# Patient Record
Sex: MALE | Race: WHITE | NOT HISPANIC OR LATINO | Employment: FULL TIME | URBAN - METROPOLITAN AREA
[De-identification: names, ages, dates, MRNs, and addresses within clinical notes are randomized per-mention and may not be internally consistent; named-entity substitution may affect disease eponyms.]

---

## 2017-02-27 ENCOUNTER — TRANSCRIBE ORDERS (OUTPATIENT)
Dept: LAB | Facility: HOSPITAL | Age: 52
End: 2017-02-27

## 2017-02-27 ENCOUNTER — LAB (OUTPATIENT)
Dept: LAB | Facility: HOSPITAL | Age: 52
End: 2017-02-27

## 2017-02-27 DIAGNOSIS — E10.65 UNCONTROLLED TYPE 1 DIABETES MELLITUS WITH HYPERGLYCEMIA (HCC): ICD-10-CM

## 2017-02-27 DIAGNOSIS — E10.65 UNCONTROLLED TYPE 1 DIABETES MELLITUS WITH HYPERGLYCEMIA (HCC): Primary | ICD-10-CM

## 2017-02-27 DIAGNOSIS — E78.00 PURE HYPERCHOLESTEROLEMIA: ICD-10-CM

## 2017-02-27 LAB
ALBUMIN SERPL-MCNC: 4.3 G/DL (ref 3.2–4.8)
ALBUMIN/GLOB SERPL: 1.6 G/DL (ref 1.5–2.5)
ALP SERPL-CCNC: 91 U/L (ref 25–100)
ALT SERPL W P-5'-P-CCNC: 17 U/L (ref 7–40)
ANION GAP SERPL CALCULATED.3IONS-SCNC: 2 MMOL/L (ref 3–11)
ARTICHOKE IGE QN: 83 MG/DL (ref 0–130)
AST SERPL-CCNC: 20 U/L (ref 0–33)
BILIRUB SERPL-MCNC: 0.6 MG/DL (ref 0.3–1.2)
BUN BLD-MCNC: 16 MG/DL (ref 9–23)
BUN/CREAT SERPL: 20 (ref 7–25)
CALCIUM SPEC-SCNC: 9.9 MG/DL (ref 8.7–10.4)
CHLORIDE SERPL-SCNC: 104 MMOL/L (ref 99–109)
CHOLEST SERPL-MCNC: 140 MG/DL (ref 0–200)
CO2 SERPL-SCNC: 35 MMOL/L (ref 20–31)
CREAT BLD-MCNC: 0.8 MG/DL (ref 0.6–1.3)
GFR SERPL CREATININE-BSD FRML MDRD: 102 ML/MIN/1.73
GLOBULIN UR ELPH-MCNC: 2.7 GM/DL
GLUCOSE BLD-MCNC: 139 MG/DL (ref 70–100)
HDLC SERPL-MCNC: 56 MG/DL (ref 40–60)
POTASSIUM BLD-SCNC: 4.4 MMOL/L (ref 3.5–5.5)
PROT SERPL-MCNC: 7 G/DL (ref 5.7–8.2)
SODIUM BLD-SCNC: 141 MMOL/L (ref 132–146)
TRIGL SERPL-MCNC: 55 MG/DL (ref 0–150)

## 2017-02-27 PROCEDURE — 80053 COMPREHEN METABOLIC PANEL: CPT | Performed by: INTERNAL MEDICINE

## 2017-02-27 PROCEDURE — 80061 LIPID PANEL: CPT | Performed by: INTERNAL MEDICINE

## 2017-02-27 PROCEDURE — 36415 COLL VENOUS BLD VENIPUNCTURE: CPT

## 2017-04-17 RX ORDER — TADALAFIL 5 MG
TABLET ORAL
Qty: 90 TABLET | Refills: 1 | Status: SHIPPED | OUTPATIENT
Start: 2017-04-17 | End: 2017-10-20 | Stop reason: SDUPTHER

## 2017-04-18 PROBLEM — N40.1 BENIGN PROSTATIC HYPERPLASIA WITH URINARY OBSTRUCTION: Status: ACTIVE | Noted: 2017-04-18

## 2017-04-18 PROBLEM — N13.8 BENIGN PROSTATIC HYPERPLASIA WITH URINARY OBSTRUCTION: Status: ACTIVE | Noted: 2017-04-18

## 2017-04-18 PROBLEM — E78.5 DYSLIPIDEMIA: Status: ACTIVE | Noted: 2017-04-18

## 2017-04-18 PROBLEM — E10.9 TYPE 1 DIABETES MELLITUS (HCC): Status: ACTIVE | Noted: 2017-04-18

## 2017-08-04 ENCOUNTER — TRANSCRIBE ORDERS (OUTPATIENT)
Dept: LAB | Facility: HOSPITAL | Age: 52
End: 2017-08-04

## 2017-08-04 ENCOUNTER — LAB (OUTPATIENT)
Dept: LAB | Facility: HOSPITAL | Age: 52
End: 2017-08-04

## 2017-08-04 ENCOUNTER — APPOINTMENT (OUTPATIENT)
Dept: LAB | Facility: HOSPITAL | Age: 52
End: 2017-08-04

## 2017-08-04 DIAGNOSIS — E87.0 TYPE I DIABETES MELLITUS WITH HYPEROSMOLAR COMA (HCC): Primary | ICD-10-CM

## 2017-08-04 DIAGNOSIS — E87.0 TYPE I DIABETES MELLITUS WITH HYPEROSMOLAR COMA (HCC): ICD-10-CM

## 2017-08-04 DIAGNOSIS — E10.69 TYPE I DIABETES MELLITUS WITH HYPEROSMOLAR COMA (HCC): Primary | ICD-10-CM

## 2017-08-04 DIAGNOSIS — E10.65 TYPE I DIABETES MELLITUS WITH HYPEROSMOLAR COMA (HCC): ICD-10-CM

## 2017-08-04 DIAGNOSIS — E10.65 TYPE I DIABETES MELLITUS WITH HYPEROSMOLAR COMA (HCC): Primary | ICD-10-CM

## 2017-08-04 DIAGNOSIS — E10.69 TYPE I DIABETES MELLITUS WITH HYPEROSMOLAR COMA (HCC): ICD-10-CM

## 2017-08-04 LAB
ALBUMIN SERPL-MCNC: 4.2 G/DL (ref 3.2–4.8)
ALBUMIN/GLOB SERPL: 1.8 G/DL (ref 1.5–2.5)
ALP SERPL-CCNC: 93 U/L (ref 25–100)
ALT SERPL W P-5'-P-CCNC: 20 U/L (ref 7–40)
ANION GAP SERPL CALCULATED.3IONS-SCNC: 4 MMOL/L (ref 3–11)
ARTICHOKE IGE QN: 74 MG/DL (ref 0–130)
AST SERPL-CCNC: 21 U/L (ref 0–33)
BILIRUB SERPL-MCNC: 0.5 MG/DL (ref 0.3–1.2)
BUN BLD-MCNC: 13 MG/DL (ref 9–23)
BUN/CREAT SERPL: 16.3 (ref 7–25)
CALCIUM SPEC-SCNC: 9.7 MG/DL (ref 8.7–10.4)
CHLORIDE SERPL-SCNC: 105 MMOL/L (ref 99–109)
CHOLEST SERPL-MCNC: 135 MG/DL (ref 0–200)
CO2 SERPL-SCNC: 31 MMOL/L (ref 20–31)
CREAT BLD-MCNC: 0.8 MG/DL (ref 0.6–1.3)
GFR SERPL CREATININE-BSD FRML MDRD: 101 ML/MIN/1.73
GLOBULIN UR ELPH-MCNC: 2.3 GM/DL
GLUCOSE BLD-MCNC: 105 MG/DL (ref 70–100)
HDLC SERPL-MCNC: 55 MG/DL (ref 40–60)
POTASSIUM BLD-SCNC: 4.4 MMOL/L (ref 3.5–5.5)
PROT SERPL-MCNC: 6.5 G/DL (ref 5.7–8.2)
SODIUM BLD-SCNC: 140 MMOL/L (ref 132–146)
T4 FREE SERPL-MCNC: 1.04 NG/DL (ref 0.89–1.76)
TRIGL SERPL-MCNC: 45 MG/DL (ref 0–150)
TSH SERPL DL<=0.05 MIU/L-ACNC: 1.65 MIU/ML (ref 0.35–5.35)

## 2017-08-04 PROCEDURE — 82570 ASSAY OF URINE CREATININE: CPT | Performed by: INTERNAL MEDICINE

## 2017-08-04 PROCEDURE — 36415 COLL VENOUS BLD VENIPUNCTURE: CPT

## 2017-08-04 PROCEDURE — 80053 COMPREHEN METABOLIC PANEL: CPT | Performed by: INTERNAL MEDICINE

## 2017-08-04 PROCEDURE — 84443 ASSAY THYROID STIM HORMONE: CPT | Performed by: INTERNAL MEDICINE

## 2017-08-04 PROCEDURE — 82043 UR ALBUMIN QUANTITATIVE: CPT | Performed by: INTERNAL MEDICINE

## 2017-08-04 PROCEDURE — 80061 LIPID PANEL: CPT | Performed by: INTERNAL MEDICINE

## 2017-08-04 PROCEDURE — 84439 ASSAY OF FREE THYROXINE: CPT | Performed by: INTERNAL MEDICINE

## 2017-08-06 LAB
CREAT 24H UR-MCNC: 172 MG/DL
MICROALB/CRT. RATIO UR: 2.2 MG/G CREAT (ref 0–30)
MICROALBUMIN UR-MCNC: 3.8 UG/ML

## 2017-10-20 DIAGNOSIS — N40.1 BENIGN PROSTATIC HYPERPLASIA WITH URINARY OBSTRUCTION: Primary | ICD-10-CM

## 2017-10-20 DIAGNOSIS — N13.8 BENIGN PROSTATIC HYPERPLASIA WITH URINARY OBSTRUCTION: Primary | ICD-10-CM

## 2017-10-20 RX ORDER — TADALAFIL 5 MG/1
5 TABLET ORAL DAILY
Qty: 90 TABLET | Refills: 0 | Status: SHIPPED | OUTPATIENT
Start: 2017-10-20 | End: 2018-03-12 | Stop reason: SDUPTHER

## 2018-03-12 DIAGNOSIS — N40.1 BENIGN PROSTATIC HYPERPLASIA WITH URINARY OBSTRUCTION: ICD-10-CM

## 2018-03-12 DIAGNOSIS — N13.8 BENIGN PROSTATIC HYPERPLASIA WITH URINARY OBSTRUCTION: ICD-10-CM

## 2018-03-12 RX ORDER — TADALAFIL 5 MG
TABLET ORAL
Qty: 90 TABLET | Refills: 0 | Status: SHIPPED | OUTPATIENT
Start: 2018-03-12 | End: 2022-02-07 | Stop reason: ALTCHOICE

## 2018-03-12 NOTE — TELEPHONE ENCOUNTER
PLease call, I sent in the Cialis for him but he is due for visit. Has been > 1 year since seen. bds

## 2018-04-10 ENCOUNTER — TRANSCRIBE ORDERS (OUTPATIENT)
Dept: LAB | Facility: HOSPITAL | Age: 53
End: 2018-04-10

## 2018-04-10 ENCOUNTER — LAB (OUTPATIENT)
Dept: LAB | Facility: HOSPITAL | Age: 53
End: 2018-04-10

## 2018-04-10 DIAGNOSIS — E87.0 TYPE I DIABETES MELLITUS WITH HYPEROSMOLAR COMA (HCC): ICD-10-CM

## 2018-04-10 DIAGNOSIS — E10.65 TYPE I DIABETES MELLITUS WITH HYPEROSMOLAR COMA (HCC): Primary | ICD-10-CM

## 2018-04-10 DIAGNOSIS — E10.69 TYPE I DIABETES MELLITUS WITH HYPEROSMOLAR COMA (HCC): ICD-10-CM

## 2018-04-10 DIAGNOSIS — E10.65 TYPE I DIABETES MELLITUS WITH HYPEROSMOLAR COMA (HCC): ICD-10-CM

## 2018-04-10 DIAGNOSIS — E87.0 TYPE I DIABETES MELLITUS WITH HYPEROSMOLAR COMA (HCC): Primary | ICD-10-CM

## 2018-04-10 DIAGNOSIS — E10.69 TYPE I DIABETES MELLITUS WITH HYPEROSMOLAR COMA (HCC): Primary | ICD-10-CM

## 2018-04-10 LAB
ALBUMIN SERPL-MCNC: 4.4 G/DL (ref 3.2–4.8)
ALBUMIN/GLOB SERPL: 1.8 G/DL (ref 1.5–2.5)
ALP SERPL-CCNC: 94 U/L (ref 25–100)
ALT SERPL W P-5'-P-CCNC: 19 U/L (ref 7–40)
ANION GAP SERPL CALCULATED.3IONS-SCNC: 6 MMOL/L (ref 3–11)
ARTICHOKE IGE QN: 67 MG/DL (ref 0–130)
AST SERPL-CCNC: 24 U/L (ref 0–33)
BILIRUB SERPL-MCNC: 0.6 MG/DL (ref 0.3–1.2)
BUN BLD-MCNC: 14 MG/DL (ref 9–23)
BUN/CREAT SERPL: 15.6 (ref 7–25)
CALCIUM SPEC-SCNC: 9.4 MG/DL (ref 8.7–10.4)
CHLORIDE SERPL-SCNC: 106 MMOL/L (ref 99–109)
CHOLEST SERPL-MCNC: 128 MG/DL (ref 0–200)
CO2 SERPL-SCNC: 31 MMOL/L (ref 20–31)
CREAT BLD-MCNC: 0.9 MG/DL (ref 0.6–1.3)
GFR SERPL CREATININE-BSD FRML MDRD: 88 ML/MIN/1.73
GLOBULIN UR ELPH-MCNC: 2.4 GM/DL
GLUCOSE BLD-MCNC: 120 MG/DL (ref 70–100)
HDLC SERPL-MCNC: 50 MG/DL (ref 40–60)
POTASSIUM BLD-SCNC: 4.4 MMOL/L (ref 3.5–5.5)
PROT SERPL-MCNC: 6.8 G/DL (ref 5.7–8.2)
SODIUM BLD-SCNC: 143 MMOL/L (ref 132–146)
TRIGL SERPL-MCNC: 50 MG/DL (ref 0–150)
TSH SERPL DL<=0.05 MIU/L-ACNC: 1.21 MIU/ML (ref 0.35–5.35)

## 2018-04-10 PROCEDURE — 80053 COMPREHEN METABOLIC PANEL: CPT

## 2018-04-10 PROCEDURE — 80061 LIPID PANEL: CPT | Performed by: INTERNAL MEDICINE

## 2018-04-10 PROCEDURE — 84681 ASSAY OF C-PEPTIDE: CPT

## 2018-04-10 PROCEDURE — 84443 ASSAY THYROID STIM HORMONE: CPT

## 2018-04-10 PROCEDURE — 36415 COLL VENOUS BLD VENIPUNCTURE: CPT

## 2018-04-12 LAB — C PEPTIDE SERPL-MCNC: <0.1 NG/ML (ref 1.1–4.4)

## 2018-06-12 ENCOUNTER — TELEPHONE (OUTPATIENT)
Dept: FAMILY MEDICINE CLINIC | Facility: CLINIC | Age: 53
End: 2018-06-12

## 2018-06-12 DIAGNOSIS — N40.1 BENIGN PROSTATIC HYPERPLASIA WITH URINARY OBSTRUCTION: ICD-10-CM

## 2018-06-12 DIAGNOSIS — N13.8 BENIGN PROSTATIC HYPERPLASIA WITH URINARY OBSTRUCTION: ICD-10-CM

## 2018-06-12 RX ORDER — TADALAFIL 5 MG
TABLET ORAL
Qty: 90 TABLET | Refills: 0 | OUTPATIENT
Start: 2018-06-12

## 2018-06-12 NOTE — TELEPHONE ENCOUNTER
Please call patient, Rec'd refill for cialis. Last seen Dec 2016. Needs office visit before can refill. bds

## 2018-07-19 ENCOUNTER — OFFICE VISIT (OUTPATIENT)
Dept: FAMILY MEDICINE CLINIC | Facility: CLINIC | Age: 53
End: 2018-07-19

## 2018-07-19 VITALS
HEART RATE: 72 BPM | TEMPERATURE: 98.2 F | DIASTOLIC BLOOD PRESSURE: 70 MMHG | WEIGHT: 161.5 LBS | BODY MASS INDEX: 24.48 KG/M2 | HEIGHT: 68 IN | SYSTOLIC BLOOD PRESSURE: 144 MMHG | RESPIRATION RATE: 20 BRPM

## 2018-07-19 DIAGNOSIS — R05.9 COUGH: ICD-10-CM

## 2018-07-19 DIAGNOSIS — J06.9 PROTRACTED URI: Primary | ICD-10-CM

## 2018-07-19 PROCEDURE — 99213 OFFICE O/P EST LOW 20 MIN: CPT | Performed by: FAMILY MEDICINE

## 2018-07-19 RX ORDER — GUAIFENESIN AND CODEINE PHOSPHATE 100; 10 MG/5ML; MG/5ML
5 SOLUTION ORAL 4 TIMES DAILY PRN
Qty: 180 ML | Refills: 0 | Status: SHIPPED | OUTPATIENT
Start: 2018-07-19 | End: 2020-02-20

## 2018-07-19 RX ORDER — AZITHROMYCIN 250 MG/1
TABLET, FILM COATED ORAL
Qty: 6 TABLET | Refills: 0 | Status: SHIPPED | OUTPATIENT
Start: 2018-07-19 | End: 2020-02-20

## 2018-07-19 NOTE — PROGRESS NOTES
Assessment/Plan       Problems Addressed this Visit        Respiratory    Cough    Relevant Medications    azithromycin (ZITHROMAX) 250 MG tablet    guaifenesin-codeine (GUAIFENESIN AC) 100-10 MG/5ML liquid      Other Visit Diagnoses     Protracted URI    -  Primary    Relevant Medications    azithromycin (ZITHROMAX) 250 MG tablet    guaifenesin-codeine (GUAIFENESIN AC) 100-10 MG/5ML liquid            Follow up: Return if symptoms worsen or fail to improve.     DISCUSSION  Start Zpak and Robitussin AC  Increase fluids     Call or follow-up if not improving.        MEDICATIONS PRESCRIBED  Requested Prescriptions     Signed Prescriptions Disp Refills   • azithromycin (ZITHROMAX) 250 MG tablet 6 tablet 0     Sig: Two po today and then one po daily for 4 days   • guaifenesin-codeine (GUAIFENESIN AC) 100-10 MG/5ML liquid 180 mL 0     Sig: Take 5 mL by mouth 4 (Four) Times a Day As Needed for Cough.            Richard dated on 7/19/2018   was reviewed and appropriate.        -------------------------------------------    Subjective     Chief Complaint   Patient presents with   • Cough     It started over a week ago. It started with some chills, pressure in his ears, severe sore throat. He went to [facility] on Sunday where he was given Prednisone, Tessalon Perles, and Zyrtec. His cough has gotten worse         Cough   This is a new (went to [facility], gave prednisone 50 mg daily for 4 days and allergy med and tessalon and no help.  Still  with cough. no antibiotic) problem. The current episode started in the past 7 days. The problem has been gradually worsening. The cough is productive of sputum (at 1st was yellow and not able to get up now but feels like chest congestion). Associated symptoms include ear pain (at 1st) and a sore throat (at 1st). Pertinent negatives include no fever (not checked temp but had chills in eveninggs) or nasal congestion. Associated symptoms comments: Lost voice, decreased  "sleep with cough. Exacerbated by: activity. Treatments tried: see above. The treatment provided no relief. There is no history of asthma or COPD.     Going to [place] for 6 weeks    BPH  Had been on cialis and not much help with urine flow anymore.   ED issues still.   Saw a urologist and gave injection therapy and has helped.   Taking one every other and weaning off now    Sees endocrinologist      History   Smoking Status   • Never Smoker   Smokeless Tobacco   • Not on file        Past Medical History,Medications, Allergies, and social history was reviewed.      Review of Systems   Constitutional: Negative.  Negative for fever (not checked temp but had chills in eveninggs).   HENT: Positive for ear pain (at 1st) and sore throat (at 1st).    Eyes: Negative.    Respiratory: Positive for cough.    Gastrointestinal: Negative.    Neurological: Negative.    Psychiatric/Behavioral: Negative.        Objective     Vitals:    07/19/18 1045   BP: 144/70   Pulse: 72   Resp: 20   Temp: 98.2 °F (36.8 °C)   Weight: 73.3 kg (161 lb 8 oz)   Height: 172.7 cm (68\")          Physical Exam   Constitutional: He is oriented to person, place, and time. Vital signs are normal. He appears well-developed and well-nourished.   HENT:   Head: Normocephalic and atraumatic.   Right Ear: Hearing, tympanic membrane, external ear and ear canal normal.   Left Ear: Hearing, tympanic membrane, external ear and ear canal normal.   Mouth/Throat: Oropharynx is clear and moist.   Eyes: Pupils are equal, round, and reactive to light. Conjunctivae, EOM and lids are normal.   Neck: Normal range of motion. Neck supple. No thyromegaly present.   Cardiovascular: Normal rate, regular rhythm and normal heart sounds.  Exam reveals no friction rub.    No murmur heard.  Pulmonary/Chest: Effort normal and breath sounds normal. No respiratory distress. He has no wheezes. He has no rales.   Abdominal: Normal appearance.   Musculoskeletal: He exhibits no edema. "   Neurological: He is alert and oriented to person, place, and time. He has normal strength.   Skin: Skin is warm and dry.   Psychiatric: He has a normal mood and affect. His speech is normal and behavior is normal. Cognition and memory are normal.   Nursing note and vitals reviewed.                Armen Galaviz MD

## 2019-02-08 ENCOUNTER — LAB REQUISITION (OUTPATIENT)
Dept: LAB | Facility: HOSPITAL | Age: 54
End: 2019-02-08

## 2019-02-08 DIAGNOSIS — Z00.00 ROUTINE GENERAL MEDICAL EXAMINATION AT A HEALTH CARE FACILITY: ICD-10-CM

## 2019-02-08 PROCEDURE — 36415 COLL VENOUS BLD VENIPUNCTURE: CPT | Performed by: INTERNAL MEDICINE

## 2020-02-20 ENCOUNTER — OFFICE VISIT (OUTPATIENT)
Dept: FAMILY MEDICINE CLINIC | Facility: CLINIC | Age: 55
End: 2020-02-20

## 2020-02-20 VITALS
HEIGHT: 68 IN | HEART RATE: 76 BPM | WEIGHT: 165 LBS | SYSTOLIC BLOOD PRESSURE: 130 MMHG | BODY MASS INDEX: 25.01 KG/M2 | TEMPERATURE: 97.8 F | RESPIRATION RATE: 18 BRPM | DIASTOLIC BLOOD PRESSURE: 86 MMHG

## 2020-02-20 DIAGNOSIS — J06.9 ACUTE URI: Primary | ICD-10-CM

## 2020-02-20 DIAGNOSIS — R05.9 COUGH: ICD-10-CM

## 2020-02-20 PROCEDURE — 99213 OFFICE O/P EST LOW 20 MIN: CPT | Performed by: PHYSICIAN ASSISTANT

## 2020-02-20 RX ORDER — GUAIFENESIN AND CODEINE PHOSPHATE 100; 10 MG/5ML; MG/5ML
5 SOLUTION ORAL 4 TIMES DAILY PRN
Qty: 180 ML | Refills: 0 | Status: SHIPPED | OUTPATIENT
Start: 2020-02-20 | End: 2022-02-07

## 2020-02-20 RX ORDER — OMEPRAZOLE 40 MG/1
40 CAPSULE, DELAYED RELEASE ORAL DAILY
COMMUNITY
End: 2022-02-07

## 2020-02-20 RX ORDER — AZITHROMYCIN 250 MG/1
TABLET, FILM COATED ORAL
Qty: 6 TABLET | Refills: 0 | Status: SHIPPED | OUTPATIENT
Start: 2020-02-20 | End: 2022-02-07

## 2020-02-20 NOTE — PROGRESS NOTES
"Subjective   Jonas Antonio is a 54 y.o. male.     URI    This is a new problem. The current episode started in the past 7 days. The problem has been gradually worsening. There has been no fever. Associated symptoms include congestion, coughing, rhinorrhea and a sore throat. Pertinent negatives include no abdominal pain, chest pain, diarrhea, dysuria, ear pain, headaches, joint pain, joint swelling, nausea, neck pain, plugged ear sensation, rash, sinus pain, sneezing, swollen glands, vomiting or wheezing. Treatments tried: OTC cough and cold products  The treatment provided mild relief.        The following portions of the patient's history were reviewed and updated as appropriate: allergies, current medications, past family history, past medical history, past social history, past surgical history and problem list.    Review of Systems   Constitutional: Positive for fatigue. Negative for chills and fever.   HENT: Positive for congestion, postnasal drip, rhinorrhea, sore throat and voice change. Negative for ear pain, sinus pain, sneezing and trouble swallowing.    Respiratory: Positive for cough. Negative for wheezing.    Cardiovascular: Negative for chest pain.   Gastrointestinal: Negative for abdominal pain, diarrhea, nausea and vomiting.   Genitourinary: Negative for dysuria.   Musculoskeletal: Negative for joint pain and neck pain.   Skin: Negative for rash.   Neurological: Negative for headaches.       Objective    Blood pressure 130/86, pulse 76, temperature 97.8 °F (36.6 °C), resp. rate 18, height 172.7 cm (68\"), weight 74.8 kg (165 lb).     Physical Exam   Constitutional: He is oriented to person, place, and time. He appears well-developed and well-nourished.   HENT:   Head: Normocephalic and atraumatic.   Right Ear: External ear and ear canal normal. Tympanic membrane is retracted. Tympanic membrane is not perforated and not erythematous.   Left Ear: External ear and ear canal normal. Tympanic membrane is " retracted. Tympanic membrane is not perforated and not erythematous.   Nose: Mucosal edema and rhinorrhea present. Right sinus exhibits no maxillary sinus tenderness and no frontal sinus tenderness. Left sinus exhibits no maxillary sinus tenderness and no frontal sinus tenderness.   Mouth/Throat: Posterior oropharyngeal erythema present. No oropharyngeal exudate or posterior oropharyngeal edema.   Eyes: Conjunctivae are normal.   Neck: Normal range of motion. Neck supple. No tracheal deviation present. No thyromegaly present.   Cardiovascular: Normal rate, regular rhythm and normal heart sounds.   Pulmonary/Chest: Effort normal and breath sounds normal. No respiratory distress. He has no wheezes. He has no rales. He exhibits no tenderness.   Lymphadenopathy:     He has no cervical adenopathy.   Neurological: He is alert and oriented to person, place, and time.   Skin: Skin is warm and dry.   Psychiatric: He has a normal mood and affect. His behavior is normal. Judgment and thought content normal.   Nursing note and vitals reviewed.      Assessment/Plan   Jonas was seen today for sore throat.    Diagnoses and all orders for this visit:    Acute URI  -     azithromycin (ZITHROMAX Z-SIDDHARTH) 250 MG tablet; Take 2 tablets the first day, then 1 tablet daily for 4 days.    Cough  -     guaiFENesin-codeine (GUAIFENESIN AC) 100-10 MG/5ML liquid; Take 5 mL by mouth 4 (Four) Times a Day As Needed for Cough.      Treatment as outlined in plan. F/U INB

## 2020-02-21 NOTE — PROGRESS NOTES
I have reviewed the notes, assessments, and/or procedures performed by BRADLEY Lay, I concur with her/his documentation of Jonas Antonio.

## 2022-02-07 ENCOUNTER — OFFICE VISIT (OUTPATIENT)
Dept: ENDOCRINOLOGY | Facility: CLINIC | Age: 57
End: 2022-02-07

## 2022-02-07 VITALS
SYSTOLIC BLOOD PRESSURE: 114 MMHG | DIASTOLIC BLOOD PRESSURE: 65 MMHG | WEIGHT: 168 LBS | HEART RATE: 77 BPM | BODY MASS INDEX: 25.46 KG/M2 | HEIGHT: 68 IN | OXYGEN SATURATION: 98 %

## 2022-02-07 DIAGNOSIS — E10.319 TYPE 1 DIABETES MELLITUS WITH RETINOPATHY OF BOTH EYES, MACULAR EDEMA PRESENCE UNSPECIFIED, UNSPECIFIED RETINOPATHY SEVERITY: Primary | ICD-10-CM

## 2022-02-07 DIAGNOSIS — Z46.81 INSULIN PUMP TITRATION: ICD-10-CM

## 2022-02-07 DIAGNOSIS — E04.1 SOLITARY THYROID NODULE: ICD-10-CM

## 2022-02-07 PROBLEM — N52.9 ERECTILE DYSFUNCTION: Status: ACTIVE | Noted: 2022-02-07

## 2022-02-07 LAB
EXPIRATION DATE: NORMAL
HBA1C MFR BLD: 8.4 %
Lab: NORMAL

## 2022-02-07 PROCEDURE — 83036 HEMOGLOBIN GLYCOSYLATED A1C: CPT | Performed by: INTERNAL MEDICINE

## 2022-02-07 PROCEDURE — 99204 OFFICE O/P NEW MOD 45 MIN: CPT | Performed by: INTERNAL MEDICINE

## 2022-02-07 RX ORDER — ASCORBIC ACID 250 MG
500 TABLET ORAL
COMMUNITY
End: 2023-03-09 | Stop reason: DRUGHIGH

## 2022-02-07 RX ORDER — BRIMONIDINE TARTRATE/TIMOLOL 0.2%-0.5%
DROPS OPHTHALMIC (EYE)
COMMUNITY
Start: 2021-11-26

## 2022-02-07 RX ORDER — BLOOD-GLUCOSE METER
1 KIT MISCELLANEOUS
Qty: 600 EACH | Refills: 3 | Status: SHIPPED | OUTPATIENT
Start: 2022-02-07 | End: 2022-03-25 | Stop reason: SDUPTHER

## 2022-02-07 NOTE — PROGRESS NOTES
Chief Complaint   Patient presents with   • Diabetes     type 1        Referring Provider  Crispin Barone MD     HPI   Jonas Antonio is a 56 y.o. male had concerns including Diabetes (type 1).    Diabetes was diagnosed 48 years ago.  Has omnipod - about 12-14 years.    Complications include retinopathy.  Last ophtho exam was end of June - has had laser treatments, vitrectomy. Sees Dr. Christopher Bethea in [place] - retina specialist.    Current medications for diabetes include omnipod pump - he has the older PDM as [employer] wasn't covering the dash through pharmacy benefits.    Pump download reviewed from 1/9/2022 through 2/5/2022 shows an average of 3.5 glucose levels entered per day, average glucose 180, highest 381, lowest 60.  Total daily insulin 37.9 units, total basal 16.35 units.  He has fairly frequent fasting hyperglycemia.  Infrequent hypoglycemia. Snacks before bed to avoid a low BG.     He checks his blood sugar 4+ times per day with fingerstick.     He has had issues with the Dexcom sensor in the past due to the degree of bleeding at the site. Happens maybe 25% of the time.     Pt recently retired. Goes to [place] for a month in the winter.     Patient also has a thyroid nodule with recent ultrasound from November at .  History of FNA x2 at [facility] which were benign.  Recommendation was to repeat ultrasound in 2023 based on stability of the nodules.    Pt followed with Kati Telles MD  In the past. Was seen at  for some time as he wanted the eversense and this was the only location offering. But was seeing two providers there for DM and thyroid, wants one provider for both.       Past Medical History:   Diagnosis Date   • Diabetes mellitus type I (HCC)    • Erectile dysfunction    • GERD (gastroesophageal reflux disease)    • Hyperlipidemia    • Thyroid nodule      Past Surgical History:   Procedure Laterality Date   • CATARACT EXTRACTION     • THUMB CARPOMETACARPAL JOINT ARTHROPLASTY FLEXOR  CARPI RADIALIS TENDON     • TONSILLECTOMY     • VITRECTOMY        Family History   Problem Relation Age of Onset   • Arthritis Mother    • Cancer Mother    • Hypertension Mother    • Heart disease Father    • Diabetes Father    • Cancer Sister    • Hypertension Brother    • Diabetes Brother       Social History     Socioeconomic History   • Marital status:    Tobacco Use   • Smoking status: Never Smoker   • Smokeless tobacco: Never Used   Substance and Sexual Activity   • Alcohol use: No   • Drug use: No      No Known Allergies   Current Outpatient Medications on File Prior to Visit   Medication Sig Dispense Refill   • ascorbic acid (VITAMIN C) 250 MG tablet Take 1,000 mg by mouth.     • aspirin 81 MG EC tablet Take 81 mg by mouth Daily.     • atorvastatin (LIPITOR) 10 MG tablet Take  by mouth.     • Combigan 0.2-0.5 % ophthalmic solution      • Insulin Disposable Pump (OMNIPOD) misc      • Multiple Vitamins-Minerals (MULTIVITAMIN ADULT PO) Take  by mouth.     • TADALAFIL PO Take 20 mg by mouth. yanna     • [DISCONTINUED] Ascorbic Acid (VITAMIN C PO) Take  by mouth.     • [DISCONTINUED] CIALIS 5 MG tablet TAKE 1 TABLET DAILY 90 tablet 0   • [DISCONTINUED] FREESTYLE TEST STRIPS test strip      • [DISCONTINUED] insulin aspart (NOVOLOG) 100 UNIT/ML injection Inject  under the skin.     • [DISCONTINUED] azithromycin (ZITHROMAX Z-SIDDHARTH) 250 MG tablet Take 2 tablets the first day, then 1 tablet daily for 4 days. 6 tablet 0   • [DISCONTINUED] guaiFENesin-codeine (GUAIFENESIN AC) 100-10 MG/5ML liquid Take 5 mL by mouth 4 (Four) Times a Day As Needed for Cough. 180 mL 0   • [DISCONTINUED] omeprazole (priLOSEC) 40 MG capsule Take 40 mg by mouth Daily.       No current facility-administered medications on file prior to visit.        Review of Systems   Constitutional: Negative.    HENT: Positive for trouble swallowing.    Eyes: Negative.    Respiratory: Negative.    Cardiovascular: Negative.    Gastrointestinal: Positive  "for GERD.   Endocrine:        See HPI   Genitourinary: Negative.    Musculoskeletal: Negative.    Skin: Negative.    Allergic/Immunologic: Negative.    Neurological: Negative.    Hematological: Negative.    Psychiatric/Behavioral: Negative.         /65   Pulse 77   Ht 172.7 cm (68\")   Wt 76.2 kg (168 lb)   SpO2 98%   BMI 25.54 kg/m²      Physical Exam    Constitutional:  well developed; well nourished  no acute distress   ENT/Thyroid: palpable nodules (left 1 cm), normal thyroid otherwise   Eyes: EOM intact  Conjunctiva: clear   Respiratory:  breathing is unlabored  clear to auscultation bilaterally   Cardiovascular:  regular rate and rhythm, S1, S2 normal, no murmur, click, rub or gallop   Chest:  Not performed.   Abdomen: Not performed.   : Not performed.   Musculoskeletal: negative findings:  ROM of all joints is normal, no deformities present   Skin: dry and warm   Neuro: normal without focal findings and mental status, speech normal, alert and oriented x3   Psych: oriented to time, place and person, mood and affect are within normal limits     CMP:  Lab Results   Component Value Date    BUN 14 04/10/2018    CREATININE 0.90 04/10/2018    EGFRIFNONA 88 04/10/2018    EGFRIFAFRI >60 12/26/2014    BCR 15.6 04/10/2018     04/10/2018    K 4.4 04/10/2018    CO2 31.0 04/10/2018    CALCIUM 9.4 04/10/2018    PROTENTOTREF 6.6 12/26/2014    ALBUMIN 4.40 04/10/2018    LABGLOBREF 3 12/26/2014    LABIL2 2 12/26/2014    BILITOT 0.6 04/10/2018    ALKPHOS 94 04/10/2018    AST 24 04/10/2018    ALT 19 04/10/2018     Lipid Panel:  Lab Results   Component Value Date    CHOL 128 04/10/2018    TRIG 50 04/10/2018    HDL 50 04/10/2018    VLDL 7 12/26/2014    LDL 67 04/10/2018     TSH:  Lab Results   Component Value Date    TSH 1.211 04/10/2018     Lab Results   Component Value Date    CPEPTIDE <0.1 (L) 04/10/2018     TSH  Order: [id number]  Component   Ref Range & Units 4 mo ago   Thyroid Stimulating Hormone, Plasma "   0.40 - 4.20 uIU/mL 1.35    [facility]ing Agency  [facility]   Specimen Collected: 10/04/21 10:24 Last Resulted: 10/04/21 13:27   Received From: [facility]  Result Received: 02/07/22 09:50     Urine Microalbumin  Order: [id number]  Component   Ref Range & Units 4 mo ago   Microalbumin, Urine   <1.9 mg/dL <1.2    Creatinine, Urine   mg/dL 50    Albumin/Creatinine Ratio   0 - 30 mg/g creatinine <24    Resulting Agency  [facility]   Specimen Collected: 10/04/21 10:30 Last Resulted: 10/04/21 13:42   Received From: [facility]  Result Received: 02/07/22 09:50         Antithyroid Peroxidase Antibody  Order: [id number]  Component   Ref Range & Units 4 mo ago   Thyroid Peroxidase Antibody   <=8 IU/mL <5    Resulting Agency  [facility]   Specimen Collected: 10/04/21 10:24 Last Resulted: 10/04/21 14:27   Received From: [facility]  Result Received: 02/07/22 09:50   Lipid panel  Order: [id number]  Component   Ref Range & Units 4 mo ago   Cholesterol, Plasma   <200 mg/dL 124    Comment: Cholesterol Reference Range (age >17 years):   Desirable               <200 mg/dL   Borderline               200 to 239 mg/dL   Undesirable             >239 mg/dL   HDL   >=40 mg/dL 47    Comment: HDL Cholesterol Reference Ranges (age >17 years):   Female, acceptable   > or = 50 mg/dL   Male, acceptable     > or = 40 mg/dL   Triglycerides, Plasma   <150 mg/dL 71    Comment: Triglyceride Reference Range (age >17 years):   Desirable:  <150 mg/dL   Borderline high:  150 to 199 mg/dL   High:  200 to 499 mg/dL   Very high:  >499 mg/dL   Increased risk of pancreatitis:  >1000 mg/dL   Cholesterol/HDL Ratio  3    LDL, Calculated   <100 mg/dL 62.8    Comment: LDL Cholesterol Reference Range (age >17 years):   Optimal:  <100 mg/dL   Near or above optional: 100 - 129 mg/dL   Borderline high: 130 - 159 mg/dL   High: 160 - 189 mg/dL   Very high: >189 mg/dL       LDL Cholesterol Reference Range (age <18 years):   Desirable:     <110 mg/dL    Borderline:    110 - 129 mg/dL   Undesirable:   >130 mg/dL   Resulting Agency [facility]   Specimen Collected: 10/04/21 10:24 Last Resulted: 10/04/21 13:27   Received From: [employer]  Result Received: 02/07/22 09:50         11/23/2021  Neck Ultrasound Report    Indication: Thyroid nodule    Comparison Imaging: No     Real time high resolution imaging of the thyroid gland was performed in transverse and longitudinal planes. Images were saved and archived to PACS.    The right thyroid lobe measured 4.15 cm length x 1.21 cm AP x 1.54 cm in TV dimension. The isthmus measured 3.0 mm in thickness. The left thyroid lobe measured 2.88 cm length x 1.17 cm AP x 0.98 cm in TV dimension.    Lobes: overall, the thyroid is asymmetric with a prominent right lobe     Nodules:   1. Left inferior thyroid nodule, measured 1.39 x 1.21 x 0.60 cm, solid cystic, isoechoic, heterogenous, ill defined border with few microcalcifications but no increased vascularity     Few subcm lymph nodes were seen in right/left level IV/III, but No pathologic lymph nodes were seen.     Impression: low suspicious nodule with the size < 2 cm    Recommendations: low suspicious nodule with the size < 2 cm, 2 times benign FNA, should be monitored with US in 2 years     Discussion   Thyroid Nodule   Thyroid US 2019 showed thyroid nodule, FNA 2 times which was unremarkable   No radiation exposure to head and neck   No personal or family history of thyroid cancer   No compressive symptoms (no Jhonny sign)     History of FNA 2 times with Dr. Telles (at [facility]), follow up thyroid US yearly, but today US was unremarkable, then should repeat Thyroid US in 2 years or with compressive symptoms     Plan  RTC 2 years for thyroid US         Assessment and Plan    Diagnoses and all orders for this visit:    1. 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  Fasting BG's are highest consistently due to snacking at bedtime to avoid hypoglycemia overnight.  Complicated by history of retinopathy.  Basal rates changed as below.  Patient will need a pump upgrade to the new Omni pod system and a message has been sent to the Omni pod rep.  Sample kashmir sensor was given to him for the interim but will plan to transition to Dexcom once he receives the new pump.  Labs are up-to-date from November including normal urine microalbumin.  Ophtho exam is up-to-date from June.  Check monofilament follow-up visit.  -     POC Glycosylated Hemoglobin (Hb A1C)  -     insulin aspart (NovoLOG) 100 UNIT/ML injection; Inject 50 Units under the skin into the appropriate area as directed Daily.  Dispense: 50 mL; Refill: 1  -     glucose blood (FREESTYLE LITE) test strip; 1 each by Other route 6 (Six) Times a Day.  Dispense: 600 each; Refill: 3    2. Insulin pump titration  Decrease basal rate between midnight to 8 AM to 0.9 units/h from 1 unit/h.  Patient will make this adjustment.    3. Solitary thyroid nodule  Ultrasound report reviewed from 11/23/2021 confirmed a left 1.4 x 1.2 x 0.6 cm nodule with some microcalcifications.  He has a history of benign FNA x2 at [facility].  Repeat ultrasound in 1 year from last, around November 2022.       Return in about 3 months (around 5/7/2022) for next scheduled follow up. The patient was instructed to contact the clinic with any interval questions or concerns.    Ana Razo, DO   Endocrinologist    Please note that portions of this note were completed with a voice recognition program.

## 2022-03-25 DIAGNOSIS — E10.319 TYPE 1 DIABETES MELLITUS WITH RETINOPATHY OF BOTH EYES, MACULAR EDEMA PRESENCE UNSPECIFIED, UNSPECIFIED RETINOPATHY SEVERITY: ICD-10-CM

## 2022-03-25 RX ORDER — BLOOD-GLUCOSE METER
1 KIT MISCELLANEOUS
Qty: 600 EACH | Refills: 1 | Status: SHIPPED | OUTPATIENT
Start: 2022-03-25 | End: 2022-05-17 | Stop reason: SDUPTHER

## 2022-03-25 NOTE — TELEPHONE ENCOUNTER
PLEASE CALL IN FOR PT HIS FREESTYLE LITE TEST STRIPS #600 STRIPS FOR A 90 DAY SUPPLY  (PLEASE CALL IN THIS WAY ONLY)    PT USES [facility]    PTS NUMBER  [phone]

## 2022-05-17 ENCOUNTER — LAB (OUTPATIENT)
Dept: LAB | Facility: HOSPITAL | Age: 57
End: 2022-05-17

## 2022-05-17 ENCOUNTER — OFFICE VISIT (OUTPATIENT)
Dept: ENDOCRINOLOGY | Facility: CLINIC | Age: 57
End: 2022-05-17

## 2022-05-17 VITALS
WEIGHT: 158 LBS | SYSTOLIC BLOOD PRESSURE: 130 MMHG | HEIGHT: 68 IN | HEART RATE: 75 BPM | DIASTOLIC BLOOD PRESSURE: 70 MMHG | OXYGEN SATURATION: 98 % | BODY MASS INDEX: 23.95 KG/M2

## 2022-05-17 DIAGNOSIS — E04.1 SOLITARY THYROID NODULE: ICD-10-CM

## 2022-05-17 DIAGNOSIS — Z46.81 INSULIN PUMP TITRATION: ICD-10-CM

## 2022-05-17 DIAGNOSIS — E10.319 TYPE 1 DIABETES MELLITUS WITH RETINOPATHY OF BOTH EYES, MACULAR EDEMA PRESENCE UNSPECIFIED, UNSPECIFIED RETINOPATHY SEVERITY: Primary | ICD-10-CM

## 2022-05-17 LAB
ALBUMIN UR-MCNC: <1.2 MG/DL
CREAT UR-MCNC: 129.7 MG/DL
EXPIRATION DATE: ABNORMAL
EXPIRATION DATE: NORMAL
GLUCOSE BLDC GLUCOMTR-MCNC: 137 MG/DL (ref 70–130)
HBA1C MFR BLD: 7.9 %
Lab: ABNORMAL
Lab: NORMAL
MICROALBUMIN/CREAT UR: NORMAL MG/G{CREAT}

## 2022-05-17 PROCEDURE — 82570 ASSAY OF URINE CREATININE: CPT | Performed by: INTERNAL MEDICINE

## 2022-05-17 PROCEDURE — 82043 UR ALBUMIN QUANTITATIVE: CPT | Performed by: INTERNAL MEDICINE

## 2022-05-17 PROCEDURE — 99214 OFFICE O/P EST MOD 30 MIN: CPT | Performed by: INTERNAL MEDICINE

## 2022-05-17 PROCEDURE — 83036 HEMOGLOBIN GLYCOSYLATED A1C: CPT | Performed by: INTERNAL MEDICINE

## 2022-05-17 PROCEDURE — 82947 ASSAY GLUCOSE BLOOD QUANT: CPT | Performed by: INTERNAL MEDICINE

## 2022-05-17 RX ORDER — BLOOD-GLUCOSE METER
KIT MISCELLANEOUS
Qty: 600 EACH | Refills: 1 | Status: SHIPPED | OUTPATIENT
Start: 2022-05-17 | End: 2022-05-17 | Stop reason: SDUPTHER

## 2022-05-17 RX ORDER — BLOOD-GLUCOSE METER
KIT MISCELLANEOUS
Qty: 600 EACH | Refills: 3 | Status: SHIPPED | OUTPATIENT
Start: 2022-05-17

## 2022-05-17 NOTE — PROGRESS NOTES
"Chief Complaint   Patient presents with   • Diabetes   • Thyroid Problem          HPI   Jonas Antonio is a 56 y.o. male had concerns including Diabetes and Thyroid Problem.    He is checking blood sugars 4 times per day.   Current medications for diabetes include insulin pump - omnipod.  Was having some low BGs and reduced the basal rate overnight. This improved low BGs. Is snacking less before bed.    Has started exercising and working with . Has lost 10 lbs since his last visit here.   Is on the treadmill every morning for about 2.5 miles.   Having more low BGs in the afternoon/evening     The following portions of the patient's history were reviewed and updated as appropriate: allergies, current medications, past family history, past medical history, past social history, past surgical history and problem list.      Review of Systems   Constitutional: Negative.    Endocrine:        See HPI        Physical Exam  Vitals reviewed.   Constitutional:       Appearance: Normal appearance.   Cardiovascular:      Rate and Rhythm: Normal rate.      Pulses:           Dorsalis pedis pulses are 2+ on the right side and 2+ on the left side.   Pulmonary:      Effort: Pulmonary effort is normal.   Feet:      Right foot:      Skin integrity: Skin integrity normal.      Toenail Condition: Right toenails are normal.      Left foot:      Skin integrity: Skin integrity normal.      Toenail Condition: Left toenails are normal.      Comments: Diabetic Foot Exam Performed and Monofilament Test Performed    Monofilament 5/5 bilaterally    Neurological:      General: No focal deficit present.      Mental Status: He is alert. Mental status is at baseline.   Psychiatric:         Mood and Affect: Mood normal.         Behavior: Behavior normal.        /70 (BP Location: Left arm, Patient Position: Sitting, Cuff Size: Adult)   Pulse 75   Ht 172.7 cm (68\")   Wt 71.7 kg (158 lb)   SpO2 98%   BMI 24.02 kg/m²      Labs " and imaging    CMP:  Lab Results   Component Value Date    GLU 90 10/04/2021    BUN 17 10/04/2021    CREATININE 0.83 10/04/2021    EGFRIFNONA >60 10/04/2021    EGFRIFAFRI >60 10/04/2021    BCR 20 10/04/2021     10/04/2021    K 4.5 10/04/2021    CO2 29 10/04/2021    CALCIUM 9.7 10/04/2021    PROTENTOTREF 6.6 12/26/2014    ALBUMIN 4.3 10/04/2021    LABGLOBREF 3 12/26/2014    LABIL2 2 12/26/2014    BILITOT 0.4 10/04/2021    ALKPHOS 105 10/04/2021    AST 18 10/04/2021    ALT 13 10/04/2021     Lipid Panel:  Lab Results   Component Value Date    CHOL 128 04/10/2018    TRIG 71 10/04/2021    HDL 47 10/04/2021    VLDL 7 12/26/2014    LDL 62.8 10/04/2021     HbA1c:  Lab Results   Component Value Date    HGBA1C 7.9 05/17/2022    HGBA1C 8.4 02/07/2022     Glucose:    Lab Results   Component Value Date    POCGLU 137 (A) 05/17/2022     TSH:  Lab Results   Component Value Date    TSH 1.211 04/10/2018     11/23/2021  Neck Ultrasound Report    Indication: Thyroid nodule    Comparison Imaging: No     Real time high resolution imaging of the thyroid gland was performed in transverse and longitudinal planes. Images were saved and archived to PACS.    The right thyroid lobe measured 4.15 cm length x 1.21 cm AP x 1.54 cm in TV dimension. The isthmus measured 3.0 mm in thickness. The left thyroid lobe measured 2.88 cm length x 1.17 cm AP x 0.98 cm in TV dimension.    Lobes: overall, the thyroid is asymmetric with a prominent right lobe     Nodules:   1. Left inferior thyroid nodule, measured 1.39 x 1.21 x 0.60 cm, solid cystic, isoechoic, heterogenous, ill defined border with few microcalcifications but no increased vascularity     Few subcm lymph nodes were seen in right/left level IV/III, but No pathologic lymph nodes were seen.     Impression: low suspicious nodule with the size < 2 cm    Recommendations: low suspicious nodule with the size < 2 cm, 2 times benign FNA, should be monitored with US in 2 years     Discussion    Thyroid Nodule   Thyroid US 2019 showed thyroid nodule, FNA 2 times which was unremarkable   No radiation exposure to head and neck   No personal or family history of thyroid cancer   No compressive symptoms (no [place] sign)     History of FNA 2 times with Dr. Telles (at [facility]), follow up thyroid US yearly, but today US was unremarkable, then should repeat Thyroid US in 2 years or with compressive symptoms     Plan  RTC 2 years for thyroid US         Assessment and plan  Diagnoses and all orders for this visit:    1. 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  Complicated by retinopathy and patient follows with retina specialist.  Changes made to the pump as below.  Is eligible for pump upgrade and patient prefers to remain with OmniPod.  Will transition to OmniPod 5 when able.  Dexcom CGM will be needed.  Labs are up-to-date from November. UMACR today. Ophtho exam is up-to-date from June. Monofilament updated today.  -     POC Glucose, Blood  -     POC Glycosylated Hemoglobin (Hb A1C)  -     Microalbumin / Creatinine Urine Ratio - Urine, Clean Catch  -     Discontinue: glucose blood (FREESTYLE LITE) test strip; Check glucose 6 to 7 times daily for a total of 600 strips for a 90 day supply  Dispense: 600 each; Refill: 1  -     glucose blood (FREESTYLE LITE) test strip; Check glucose 6 to 7 times daily for a total of 600 strips for a 90 day supply, E10.9  Dispense: 600 each; Refill: 3    2. Insulin pump titration  Change upper BG limit to 140 from 100.   IF still with low BGs, decrease basal rate between 12P-12A to 0.4 units/hr from 0.5 units.    3. Solitary thyroid nodule  Ultrasound report reviewed from 11/23/2021 confirmed a left 1.4 x 1.2 x 0.6 cm nodule with some microcalcifications.  He has a history of  benign FNA x2 at [facility].  Repeat ultrasound in 1 year from last, around November 2022.     Return in about 3 months (around 8/17/2022) for next scheduled follow up. The patient was instructed to contact the clinic with any interval questions or concerns.    Ana Razo, DO   Endocrinologist    Please note that portions of this note were completed with a voice recognition program.

## 2022-06-15 ENCOUNTER — TELEPHONE (OUTPATIENT)
Dept: ENDOCRINOLOGY | Facility: CLINIC | Age: 57
End: 2022-06-15

## 2022-06-15 RX ORDER — INSULIN PUMP CART,CONT INF,RF
1 CARTRIDGE (EA) SUBCUTANEOUS
Qty: 10 EACH | Refills: 2 | Status: SHIPPED | OUTPATIENT
Start: 2022-06-15 | End: 2022-08-24

## 2022-06-15 NOTE — TELEPHONE ENCOUNTER
We sent refill to [facility] - pt states no!  Needs to go to [facility].  Sent a fax to cancel refill to [facility] as I couldn't reach them by phone.    Waiting on form from [facility]

## 2022-08-24 ENCOUNTER — TELEPHONE (OUTPATIENT)
Dept: ENDOCRINOLOGY | Facility: CLINIC | Age: 57
End: 2022-08-24

## 2022-08-24 ENCOUNTER — OFFICE VISIT (OUTPATIENT)
Dept: ENDOCRINOLOGY | Facility: CLINIC | Age: 57
End: 2022-08-24

## 2022-08-24 VITALS
OXYGEN SATURATION: 99 % | HEART RATE: 68 BPM | SYSTOLIC BLOOD PRESSURE: 118 MMHG | BODY MASS INDEX: 23.04 KG/M2 | DIASTOLIC BLOOD PRESSURE: 70 MMHG | HEIGHT: 68 IN | WEIGHT: 152 LBS

## 2022-08-24 DIAGNOSIS — E04.1 SOLITARY THYROID NODULE: ICD-10-CM

## 2022-08-24 DIAGNOSIS — E10.319 TYPE 1 DIABETES MELLITUS WITH RETINOPATHY OF BOTH EYES, MACULAR EDEMA PRESENCE UNSPECIFIED, UNSPECIFIED RETINOPATHY SEVERITY: Primary | ICD-10-CM

## 2022-08-24 LAB
EXPIRATION DATE: ABNORMAL
EXPIRATION DATE: NORMAL
GLUCOSE BLDC GLUCOMTR-MCNC: 169 MG/DL (ref 70–130)
HBA1C MFR BLD: 7.8 %
Lab: ABNORMAL
Lab: NORMAL

## 2022-08-24 PROCEDURE — 99214 OFFICE O/P EST MOD 30 MIN: CPT | Performed by: INTERNAL MEDICINE

## 2022-08-24 PROCEDURE — 82947 ASSAY GLUCOSE BLOOD QUANT: CPT | Performed by: INTERNAL MEDICINE

## 2022-08-24 PROCEDURE — 83036 HEMOGLOBIN GLYCOSYLATED A1C: CPT | Performed by: INTERNAL MEDICINE

## 2022-08-24 RX ORDER — PROCHLORPERAZINE 25 MG/1
1 SUPPOSITORY RECTAL TAKE AS DIRECTED
Qty: 9 EACH | Refills: 3 | Status: SHIPPED | OUTPATIENT
Start: 2022-08-24 | End: 2022-10-05 | Stop reason: SDUPTHER

## 2022-08-24 RX ORDER — PROCHLORPERAZINE 25 MG/1
1 SUPPOSITORY RECTAL ONCE
Qty: 1 EACH | Refills: 0 | Status: SHIPPED | OUTPATIENT
Start: 2022-08-24 | End: 2022-08-24

## 2022-08-24 RX ORDER — PROCHLORPERAZINE 25 MG/1
1 SUPPOSITORY RECTAL TAKE AS DIRECTED
Qty: 1 EACH | Refills: 3 | Status: SHIPPED | OUTPATIENT
Start: 2022-08-24 | End: 2022-10-05 | Stop reason: SDUPTHER

## 2022-08-24 RX ORDER — INSULIN PMP CART,AUT,G6/7,CNTR
1 EACH SUBCUTANEOUS DAILY
Qty: 1 KIT | Refills: 0 | Status: SHIPPED | OUTPATIENT
Start: 2022-08-24

## 2022-08-24 NOTE — TELEPHONE ENCOUNTER
Patient notified that denial needed from pharmacy in order to do PA.  He is going to contact them and have them send over denial.

## 2022-08-24 NOTE — PROGRESS NOTES
"Chief Complaint   Patient presents with   • Diabetes          HPI   Jonas Antonio is a 57 y.o. male had concerns including Diabetes.    He is checking blood sugars 3-4 times per day.  Glucose levels range .  Most of the time are in the 100s.  Current medications for diabetes include insulin pump - omnipod. Has tried to switch to omnipod 5     The following portions of the patient's history were reviewed and updated as appropriate: allergies, current medications, past family history, past medical history, past social history, past surgical history and problem list.      Review of Systems   Constitutional: Negative.    Endocrine:        See HPI        Physical Exam  Vitals reviewed.   Constitutional:       Appearance: Normal appearance.   Cardiovascular:      Rate and Rhythm: Normal rate.   Pulmonary:      Effort: Pulmonary effort is normal.   Neurological:      General: No focal deficit present.      Mental Status: He is alert. Mental status is at baseline.   Psychiatric:         Mood and Affect: Mood normal.         Behavior: Behavior normal.        /70   Pulse 68   Ht 172.7 cm (68\")   Wt 68.9 kg (152 lb)   SpO2 99%   BMI 23.11 kg/m²      Labs and imaging    CMP:  Lab Results   Component Value Date    GLU 90 10/04/2021    BUN 17 10/04/2021    CREATININE 0.83 10/04/2021    EGFRIFNONA >60 10/04/2021    EGFRIFAFRI >60 10/04/2021    BCR 20 10/04/2021     10/04/2021    K 4.5 10/04/2021    CO2 29 10/04/2021    CALCIUM 9.7 10/04/2021    PROTENTOTREF 6.6 12/26/2014    ALBUMIN 4.3 10/04/2021    LABGLOBREF 3 12/26/2014    LABIL2 2 12/26/2014    BILITOT 0.4 10/04/2021    ALKPHOS 105 10/04/2021    AST 18 10/04/2021    ALT 13 10/04/2021     Lipid Panel:  Lab Results   Component Value Date    CHOL 128 04/10/2018    TRIG 71 10/04/2021    HDL 47 10/04/2021    VLDL 7 12/26/2014    LDL 62.8 10/04/2021     HbA1c:  Lab Results   Component Value Date    HGBA1C 7.8 08/24/2022    HGBA1C 7.9 05/17/2022 "     Glucose:    Lab Results   Component Value Date    POCGLU 169 (A) 08/24/2022     Microalbumin:  Lab Results   Component Value Date    MALBCRERATIO  05/17/2022      Comment:      Unable to calculate     TSH:  Lab Results   Component Value Date    TSH 1.211 04/10/2018     11/23/2021  Neck Ultrasound Report    Indication: Thyroid nodule    Comparison Imaging: No     Real time high resolution imaging of the thyroid gland was performed in transverse and longitudinal planes. Images were saved and archived to PACS.    The right thyroid lobe measured 4.15 cm length x 1.21 cm AP x 1.54 cm in TV dimension. The isthmus measured 3.0 mm in thickness. The left thyroid lobe measured 2.88 cm length x 1.17 cm AP x 0.98 cm in TV dimension.    Lobes: overall, the thyroid is asymmetric with a prominent right lobe     Nodules:   1. Left inferior thyroid nodule, measured 1.39 x 1.21 x 0.60 cm, solid cystic, isoechoic, heterogenous, ill defined border with few microcalcifications but no increased vascularity     Few subcm lymph nodes were seen in right/left level IV/III, but No pathologic lymph nodes were seen.     Impression: low suspicious nodule with the size < 2 cm    Recommendations: low suspicious nodule with the size < 2 cm, 2 times benign FNA, should be monitored with US in 2 years     Discussion   Thyroid Nodule   Thyroid US 2019 showed thyroid nodule, FNA 2 times which was unremarkable   No radiation exposure to head and neck   No personal or family history of thyroid cancer   No compressive symptoms (no [place] sign)     History of FNA 2 times with Dr. Telles (at [facility] and [facility]), follow up thyroid US yearly, but today US was unremarkable, then should repeat Thyroid US in 2 years or with compressive symptoms     Plan  RTC 2 years for thyroid US            Assessment and plan  Diagnoses and all orders for this visit:    1. 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, macular edema presence  unspecified, unspecified retinopathy severity (HCC) (Primary)  Uncontrolled with hyperglycemia.  A1c 7.8.  A1c higher than glucose levels entered into pump, suspect postprandial hyperglycemia.  Complicated by retinopathy.  He typically uses the prolonged bolus feature due to hyperglycemia with boluses.  Advised that he consider doing a percentage of the bolus (maybe 25%) at the start of the meal and then the remainder of the bolus as he has been.  Prescription sent for OmniPod 5 and Dexcom.  Patient checks his blood sugars 4 times per day, has the need for frequent glucose monitoring, continued use of insulin pump, need to adjust his boluses based on frequent BG levels and therefore should remain on insulin pump and needs CGM.  Labs are up-to-date from October.  Urine microalbumin up-to-date from May.  Monofilament up-to-date from May.  Ophtho exam is up-to-date from June and the patient will have the report sent here.  -     POC Glucose, Blood  -     POC Glycosylated Hemoglobin (Hb A1C)  -     Insulin Disposable Pump (Omnipod 5 G6 Intro, Gen 5,) kit; 1 each Daily.  Dispense: 1 kit; Refill: 0  -     Continuous Blood Gluc  (Dexcom G6 ) device; 1 each 1 (One) Time for 1 dose.  Dispense: 1 each; Refill: 0  -     Continuous Blood Gluc Sensor (Dexcom G6 Sensor); 1 each Take As Directed.  Dispense: 9 each; Refill: 3  -     Continuous Blood Gluc Transmit (Dexcom G6 Transmitter) misc; 1 each Take As Directed.  Dispense: 1 each; Refill: 3    2. Solitary thyroid nodule  Ultrasound report reviewed from 11/23/2021 confirmed a left 1.4 x 1.2 x 0.6 cm nodule with some microcalcifications.  He has a history of benign FNA x 2 at [facility].  Repeat ultrasound in 1 year from last, around November 2022.           Return in about 3 months (around 11/24/2022) for next scheduled follow up, with thyroid ultrasound ** 30 min appt. The patient was instructed to contact the clinic with any interval questions or  concerns.    Ana Razo, DO   Endocrinologist    Please note that portions of this note were completed with a voice recognition program.

## 2022-08-24 NOTE — TELEPHONE ENCOUNTER
PT CALLED TO SAY THAT THE OMNIPOD 5 NEEDS A PA AND WE CAN CALL THIS NUMBER  [facility] [phone] OPT 1 AND THEN OPT 1 AGAIN    PLEASE CALL THE PT BACK TO LET HIM KNOW WE ARE TAKING CARE OF THIS  PTS NUMBER  [phone]

## 2022-10-05 ENCOUNTER — TELEPHONE (OUTPATIENT)
Dept: ENDOCRINOLOGY | Facility: CLINIC | Age: 57
End: 2022-10-05

## 2022-10-05 DIAGNOSIS — E10.319 TYPE 1 DIABETES MELLITUS WITH RETINOPATHY OF BOTH EYES, MACULAR EDEMA PRESENCE UNSPECIFIED, UNSPECIFIED RETINOPATHY SEVERITY: ICD-10-CM

## 2022-10-05 RX ORDER — PROCHLORPERAZINE 25 MG/1
1 SUPPOSITORY RECTAL TAKE AS DIRECTED
Qty: 1 EACH | Refills: 3 | Status: SHIPPED | OUTPATIENT
Start: 2022-10-05

## 2022-10-05 RX ORDER — PROCHLORPERAZINE 25 MG/1
1 SUPPOSITORY RECTAL TAKE AS DIRECTED
Qty: 9 EACH | Refills: 3 | Status: SHIPPED | OUTPATIENT
Start: 2022-10-05

## 2022-10-10 ENCOUNTER — TELEPHONE (OUTPATIENT)
Dept: ENDOCRINOLOGY | Facility: CLINIC | Age: 57
End: 2022-10-10

## 2022-10-10 DIAGNOSIS — E10.319 TYPE 1 DIABETES MELLITUS WITH RETINOPATHY OF BOTH EYES, MACULAR EDEMA PRESENCE UNSPECIFIED, UNSPECIFIED RETINOPATHY SEVERITY: Primary | ICD-10-CM

## 2022-10-10 NOTE — TELEPHONE ENCOUNTER
Pt called has received his Omnipod 5 G6 pt needs to scheduled training with diabetes educator. Pt last f/u 08/24/22 pt next f/u 12/07/22

## 2022-10-20 ENCOUNTER — DOCUMENTATION (OUTPATIENT)
Dept: DIABETES SERVICES | Facility: HOSPITAL | Age: 57
End: 2022-10-20

## 2022-10-20 RX ORDER — INSULIN PMP CART,AUT,G6/7,CNTR
1 EACH SUBCUTANEOUS
Qty: 30 EACH | Refills: 3 | Status: SHIPPED | OUTPATIENT
Start: 2022-10-20

## 2022-10-20 NOTE — PLAN OF CARE
Called patient to schedule OP5 training. He is upgrading from the EROS. He is in the process of self starting, and voices he has transferred current settings, started his Dexcom again and is planning on applying a pod this evening. I encouraged him to review the training module on the omnipod website on upgrading. [facility] or may contact this office during regular office hours for support.

## 2022-10-26 DIAGNOSIS — E10.319 TYPE 1 DIABETES MELLITUS WITH RETINOPATHY OF BOTH EYES, MACULAR EDEMA PRESENCE UNSPECIFIED, UNSPECIFIED RETINOPATHY SEVERITY: ICD-10-CM

## 2022-10-27 RX ORDER — INSULIN ASPART 100 [IU]/ML
INJECTION, SOLUTION INTRAVENOUS; SUBCUTANEOUS
Qty: 50 ML | Refills: 1 | Status: SHIPPED | OUTPATIENT
Start: 2022-10-27

## 2022-12-07 ENCOUNTER — OFFICE VISIT (OUTPATIENT)
Dept: ENDOCRINOLOGY | Facility: CLINIC | Age: 57
End: 2022-12-07

## 2022-12-07 ENCOUNTER — LAB (OUTPATIENT)
Dept: LAB | Facility: HOSPITAL | Age: 57
End: 2022-12-07

## 2022-12-07 VITALS
BODY MASS INDEX: 22.73 KG/M2 | HEIGHT: 68 IN | SYSTOLIC BLOOD PRESSURE: 116 MMHG | WEIGHT: 150 LBS | OXYGEN SATURATION: 95 % | HEART RATE: 61 BPM | DIASTOLIC BLOOD PRESSURE: 66 MMHG

## 2022-12-07 DIAGNOSIS — E78.5 DYSLIPIDEMIA: ICD-10-CM

## 2022-12-07 DIAGNOSIS — E10.319 TYPE 1 DIABETES MELLITUS WITH RETINOPATHY OF BOTH EYES, MACULAR EDEMA PRESENCE UNSPECIFIED, UNSPECIFIED RETINOPATHY SEVERITY: Primary | ICD-10-CM

## 2022-12-07 DIAGNOSIS — E10.319 TYPE 1 DIABETES MELLITUS WITH RETINOPATHY OF BOTH EYES, MACULAR EDEMA PRESENCE UNSPECIFIED, UNSPECIFIED RETINOPATHY SEVERITY: ICD-10-CM

## 2022-12-07 LAB
25(OH)D3 SERPL-MCNC: 45.1 NG/ML (ref 30–100)
EXPIRATION DATE: ABNORMAL
EXPIRATION DATE: NORMAL
GLUCOSE BLDC GLUCOMTR-MCNC: 151 MG/DL (ref 70–130)
HBA1C MFR BLD: 7.5 %
Lab: ABNORMAL
Lab: NORMAL

## 2022-12-07 PROCEDURE — 83036 HEMOGLOBIN GLYCOSYLATED A1C: CPT | Performed by: INTERNAL MEDICINE

## 2022-12-07 PROCEDURE — 82306 VITAMIN D 25 HYDROXY: CPT

## 2022-12-07 PROCEDURE — 82570 ASSAY OF URINE CREATININE: CPT

## 2022-12-07 PROCEDURE — 84443 ASSAY THYROID STIM HORMONE: CPT

## 2022-12-07 PROCEDURE — 99214 OFFICE O/P EST MOD 30 MIN: CPT | Performed by: INTERNAL MEDICINE

## 2022-12-07 PROCEDURE — 80053 COMPREHEN METABOLIC PANEL: CPT

## 2022-12-07 PROCEDURE — 82043 UR ALBUMIN QUANTITATIVE: CPT

## 2022-12-07 PROCEDURE — 84439 ASSAY OF FREE THYROXINE: CPT

## 2022-12-07 PROCEDURE — 80061 LIPID PANEL: CPT

## 2022-12-07 PROCEDURE — 95251 CONT GLUC MNTR ANALYSIS I&R: CPT | Performed by: INTERNAL MEDICINE

## 2022-12-07 NOTE — ASSESSMENT & PLAN NOTE
Blood sugar and 90 day average sugar reviewed  Results for orders placed or performed in visit on 12/07/22   POC Glucose, Blood    Specimen: Blood   Result Value Ref Range    Glucose 151 (A) 70 - 130 mg/dL    Lot Number [id number]     Expiration Date 06/23/23    POC Glycosylated Hemoglobin (Hb A1C)    Specimen: Blood   Result Value Ref Range    Hemoglobin A1C 7.5 %    Lot Number [id number]     Expiration Date 10/17/24      Average sugar is 165  Is utd with eye exam  No neuropathy or callus  Ur alb neg  F/u 3-4 months  omnipod reviewed and sensor data discussed  Ok to reduce correction factor to 45 units  Update in 1 week to see if this has helped  Duration of insulin action 2 hours - discussed that he may need to raise this to 3 hours due to risk of stacking

## 2022-12-07 NOTE — PROGRESS NOTES
Jonas Antonio 57 y.o.  CC: Diabetes (Type I) and Thyroid Problem      Jamul: Diabetes (Type I) and Thyroid Problem    Blood sugar and 90 day average sugar reviewed  Results for orders placed or performed in visit on 12/07/22   POC Glucose, Blood    Specimen: Blood   Result Value Ref Range    Glucose 151 (A) 70 - 130 mg/dL    Lot Number [id number]     Expiration Date 06/23/23    POC Glycosylated Hemoglobin (Hb A1C)    Specimen: Blood   Result Value Ref Range    Hemoglobin A1C 7.5 %    Lot Number [id number]     Expiration Date 10/17/24      Average sugar is 165   omnipod pump and sensor downloaded and reviewed  TDD 23 units   82% of the time he is in Auto Mode  86% of sugars are in target  He does not feel like insulin correction brings sugar down   Discussed total daily dose- need for him to reduce correction factor to 45 to increase dose for correction reviewed   20 days of sensor data reviewed and discussed  Has been diabetic x 50 years - retinopathy and vitrectomy - Dr Christopher Bethea 6/23  Also sees Dr Rell Edward for Glaucoma twice yearly   Is utd with eye exam- travels to [place] for updated eye exam- h/o retinopathy with laser, vitrectomy   Is eating low fat diet and taking lipitor 10 mg daily   No neuropathy or callus today   Taking regular bolus with breakfast   Taking manual bolus with lunch and dinner- dual or square wave due to low sugar if he boluses without square wave  Ur alb neg this year    No Known Allergies    Current Outpatient Medications:   •  ascorbic acid (VITAMIN C) 250 MG tablet, Take 500 mg by mouth., Disp: , Rfl:   •  atorvastatin (LIPITOR) 10 MG tablet, Take  by mouth., Disp: , Rfl:   •  Combigan 0.2-0.5 % ophthalmic solution, , Disp: , Rfl:   •  Continuous Blood Gluc Sensor (Dexcom G6 Sensor), 1 each Take As Directed., Disp: 9 each, Rfl: 3  •  Continuous Blood Gluc Transmit (Dexcom G6 Transmitter) misc, 1 each Take As Directed., Disp: 1 each, Rfl: 3  •  glucose blood (FREESTYLE LITE)  test strip, Check glucose 6 to 7 times daily for a total of 600 strips for a 90 day supply, E10.9, Disp: 600 each, Rfl: 3  •  Insulin Disposable Pump (Omnipod 5 G6 Intro, Gen 5,) kit, 1 each Daily., Disp: 1 kit, Rfl: 0  •  Insulin Disposable Pump (Omnipod 5 G6 Pod, Gen 5,) misc, 1 each Every 3 (Three) Days., Disp: 30 each, Rfl: 3  •  Multiple Vitamins-Minerals (MULTIVITAMIN ADULT PO), Take  by mouth., Disp: , Rfl:   •  NovoLOG 100 UNIT/ML injection, INJECT 50 UNITS            SUBCUTANEOUSLY INTO THE    APPROPRIATE AREA AS        DIRECTED DAILY (Patient taking differently: Omni Pod usage ...MDD of 75 units), Disp: 50 mL, Rfl: 1  •  TADALAFIL PO, Take 20 mg by mouth. yanna, Disp: , Rfl:   Patient Active Problem List    Diagnosis    • Thyroid nodule [E04.1]    • Erectile dysfunction [N52.9]    • Diabetic retinopathy associated with type 1 diabetes mellitus (HCC) [E10.319]    • Benign prostatic hyperplasia with urinary obstruction [N40.1, N13.8]    • Dyslipidemia [E78.5]    • Type 1 diabetes mellitus (HCC) [E10.9]      Review of Systems   Constitutional: Negative for activity change, appetite change and unexpected weight change.   HENT: Negative for congestion and rhinorrhea.    Eyes: Negative for visual disturbance.   Respiratory: Negative for cough and shortness of breath.    Cardiovascular: Negative for palpitations and leg swelling.   Gastrointestinal: Negative for constipation, diarrhea and nausea.   Genitourinary: Negative for hematuria.   Musculoskeletal: Negative for arthralgias, back pain, gait problem, joint swelling and myalgias.   Skin: Negative for color change, rash and wound.   Allergic/Immunologic: Negative for environmental allergies, food allergies and immunocompromised state.   Neurological: Negative for dizziness, weakness and light-headedness.   Psychiatric/Behavioral: Negative for confusion, decreased concentration, dysphoric mood and sleep disturbance. The patient is not nervous/anxious.   "    Social History     Socioeconomic History   • Marital status:    Tobacco Use   • Smoking status: Never   • Smokeless tobacco: Never   Vaping Use   • Vaping Use: Never used   Substance and Sexual Activity   • Alcohol use: No   • Drug use: No   • Sexual activity: Defer     Family History   Problem Relation Age of Onset   • Arthritis Mother    • Cancer Mother    • Hypertension Mother    • Heart disease Father    • Diabetes Father    • Cancer Sister    • Hypertension Brother    • Diabetes Brother      /66 (BP Location: Left arm, Patient Position: Sitting, Cuff Size: Adult)   Pulse 61   Ht 172.7 cm (68\")   Wt 68 kg (150 lb)   SpO2 95%   BMI 22.81 kg/m²   Physical Exam  Vitals and nursing note reviewed.   Constitutional:       Appearance: Normal appearance. He is well-developed.   HENT:      Head: Normocephalic and atraumatic.   Eyes:      General: Lids are normal.      Extraocular Movements: Extraocular movements intact.      Conjunctiva/sclera: Conjunctivae normal.      Pupils: Pupils are equal, round, and reactive to light.   Neck:      Thyroid: No thyroid mass or thyromegaly.      Vascular: No carotid bruit.      Trachea: Trachea normal. No tracheal deviation.   Cardiovascular:      Rate and Rhythm: Normal rate and regular rhythm.      Pulses: Normal pulses.      Heart sounds: Normal heart sounds. No murmur heard.    No friction rub. No gallop.   Pulmonary:      Effort: Pulmonary effort is normal. No respiratory distress.      Breath sounds: Normal breath sounds. No wheezing.   Musculoskeletal:         General: No deformity. Normal range of motion.      Cervical back: Normal range of motion and neck supple.   Lymphadenopathy:      Cervical: No cervical adenopathy.   Skin:     General: Skin is warm and dry.      Findings: No erythema or rash.      Nails: There is no clubbing.   Neurological:      General: No focal deficit present.      Mental Status: He is alert and oriented to person, place, and " time.      Cranial Nerves: No cranial nerve deficit.      Deep Tendon Reflexes: Reflexes are normal and symmetric. Reflexes normal.   Psychiatric:         Mood and Affect: Mood normal.         Speech: Speech normal.         Behavior: Behavior normal.         Thought Content: Thought content normal.         Judgment: Judgment normal.       Results for orders placed or performed in visit on 12/07/22   POC Glucose, Blood    Specimen: Blood   Result Value Ref Range    Glucose 151 (A) 70 - 130 mg/dL    Lot Number [id number]     Expiration Date 06/23/23    POC Glycosylated Hemoglobin (Hb A1C)    Specimen: Blood   Result Value Ref Range    Hemoglobin A1C 7.5 %    Lot Number [id number]     Expiration Date 10/17/24      Diagnoses and all orders for this visit:    1. 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, macular edema presence unspecified, unspecified retinopathy severity (HCC) (Primary)  Assessment & Plan:  Blood sugar and 90 day average sugar reviewed  Results for orders placed or performed in visit on 12/07/22   POC Glucose, Blood    Specimen: Blood   Result Value Ref Range    Glucose 151 (A) 70 - 130 mg/dL    Lot Number [id number]     Expiration Date 06/23/23    POC Glycosylated Hemoglobin (Hb A1C)    Specimen: Blood   Result Value Ref Range    Hemoglobin A1C 7.5 %    Lot Number [id number]     Expiration Date 10/17/24      Average sugar is 165  Is utd with eye exam  No neuropathy or callus  Ur alb neg  F/u 3-4 months  omnipod reviewed and sensor data discussed  Ok to reduce correction factor to 45 units  Update in 1 week to see if this has helped  Duration of insulin action 2 hours - discussed that he may need to raise this to 3 hours due to risk of stacking     Orders:  -     POC Glucose, Blood  -     POC Glycosylated Hemoglobin (Hb A1C)  -     Comprehensive Metabolic Panel; Future  -     Microalbumin / Creatinine Urine Ratio - Urine, Clean Catch; Future    2. Dyslipidemia  Assessment & Plan:  Update  lipid panel   Continue lipitor 10 mg daily      Orders:  -     Lipid Panel; Future  -     TSH; Future  -     T4, Free; Future  -     Vitamin D,25-Hydroxy; Future  Return in about 3 months (around 3/7/2023) for Recheck.    Aracely Allison MD  Signed Aracely Allison MD

## 2022-12-08 LAB
ALBUMIN SERPL-MCNC: 4.1 G/DL (ref 3.5–5.2)
ALBUMIN UR-MCNC: <1.2 MG/DL
ALBUMIN/GLOB SERPL: 1.9 G/DL
ALP SERPL-CCNC: 86 U/L (ref 39–117)
ALT SERPL W P-5'-P-CCNC: 16 U/L (ref 1–41)
ANION GAP SERPL CALCULATED.3IONS-SCNC: 6 MMOL/L (ref 5–15)
AST SERPL-CCNC: 20 U/L (ref 1–40)
BILIRUB SERPL-MCNC: 0.3 MG/DL (ref 0–1.2)
BUN SERPL-MCNC: 17 MG/DL (ref 6–20)
BUN/CREAT SERPL: 21.8 (ref 7–25)
CALCIUM SPEC-SCNC: 9.7 MG/DL (ref 8.6–10.5)
CHLORIDE SERPL-SCNC: 104 MMOL/L (ref 98–107)
CHOLEST SERPL-MCNC: 127 MG/DL (ref 0–200)
CO2 SERPL-SCNC: 31 MMOL/L (ref 22–29)
CREAT SERPL-MCNC: 0.78 MG/DL (ref 0.76–1.27)
CREAT UR-MCNC: 115.8 MG/DL
EGFRCR SERPLBLD CKD-EPI 2021: 103.4 ML/MIN/1.73
GLOBULIN UR ELPH-MCNC: 2.2 GM/DL
GLUCOSE SERPL-MCNC: 148 MG/DL (ref 65–99)
HDLC SERPL-MCNC: 52 MG/DL (ref 40–60)
LDLC SERPL CALC-MCNC: 64 MG/DL (ref 0–100)
LDLC/HDLC SERPL: 1.27 {RATIO}
MICROALBUMIN/CREAT UR: NORMAL MG/G{CREAT}
POTASSIUM SERPL-SCNC: 4.9 MMOL/L (ref 3.5–5.2)
PROT SERPL-MCNC: 6.3 G/DL (ref 6–8.5)
SODIUM SERPL-SCNC: 141 MMOL/L (ref 136–145)
T4 FREE SERPL-MCNC: 1.21 NG/DL (ref 0.93–1.7)
TRIGL SERPL-MCNC: 46 MG/DL (ref 0–150)
TSH SERPL DL<=0.05 MIU/L-ACNC: 1.74 UIU/ML (ref 0.27–4.2)
VLDLC SERPL-MCNC: 11 MG/DL (ref 5–40)

## 2023-03-09 ENCOUNTER — OFFICE VISIT (OUTPATIENT)
Dept: ENDOCRINOLOGY | Facility: CLINIC | Age: 58
End: 2023-03-09
Payer: COMMERCIAL

## 2023-03-09 VITALS
OXYGEN SATURATION: 99 % | BODY MASS INDEX: 22.49 KG/M2 | SYSTOLIC BLOOD PRESSURE: 112 MMHG | DIASTOLIC BLOOD PRESSURE: 60 MMHG | WEIGHT: 148.4 LBS | HEIGHT: 68 IN | HEART RATE: 58 BPM

## 2023-03-09 DIAGNOSIS — E10.319 TYPE 1 DIABETES MELLITUS WITH RETINOPATHY OF BOTH EYES, MACULAR EDEMA PRESENCE UNSPECIFIED, UNSPECIFIED RETINOPATHY SEVERITY: Primary | ICD-10-CM

## 2023-03-09 DIAGNOSIS — E78.5 DYSLIPIDEMIA: ICD-10-CM

## 2023-03-09 LAB
EXPIRATION DATE: ABNORMAL
EXPIRATION DATE: NORMAL
GLUCOSE BLDC GLUCOMTR-MCNC: 214 MG/DL (ref 70–130)
HBA1C MFR BLD: 7.3 %
Lab: ABNORMAL
Lab: NORMAL

## 2023-03-09 PROCEDURE — 95251 CONT GLUC MNTR ANALYSIS I&R: CPT | Performed by: INTERNAL MEDICINE

## 2023-03-09 PROCEDURE — 99214 OFFICE O/P EST MOD 30 MIN: CPT | Performed by: INTERNAL MEDICINE

## 2023-03-09 PROCEDURE — 83036 HEMOGLOBIN GLYCOSYLATED A1C: CPT | Performed by: INTERNAL MEDICINE

## 2023-03-09 RX ORDER — TERBINAFINE HYDROCHLORIDE 250 MG/1
250 TABLET ORAL DAILY
Qty: 84 TABLET | Refills: 0 | Status: SHIPPED | OUTPATIENT
Start: 2023-03-09

## 2023-03-09 NOTE — PROGRESS NOTES
Jonas Antonio 57 y.o.  CC:Follow-up and Diabetes (TYpe II, eye exam in [place] June 2022 - Dr Christopher Bethea/)    [place]: Follow-up and Diabetes (TYpe II, eye exam in [place] June 2022 - Dr Christopher Bethea/)    Blood sugar and 90 day average sugar reviewed  Results for orders placed or performed in visit on 03/09/23   POC Glycosylated Hemoglobin (Hb A1C)    Specimen: Blood   Result Value Ref Range    Hemoglobin A1C 7.3 %    Lot Number [id number]     Expiration Date 11/21/2024    POC Glucose, Blood    Specimen: Blood   Result Value Ref Range    Glucose 214 (A) 70 - 130 mg/dL    Lot Number [id number]     Expiration Date 10/14/2023      bp is good   Downloaded OP5 data and reviewed  Overall pp sugars were high but now better  He has been using extended bolus and increased the up front amount  Discussed breakfast- usually cereal but he has found using unsweetened cereal with artificial sweetener works best  Stable eye exam- Dr Bethea in [place] (has had laser, vitrectomies)  Cc is toenail fungus  Would like treatment for this- pros and cons reviewed  21 days of sensor data reviewed   Findings as noted above    No Known Allergies    Current Outpatient Medications:   •  Ascorbic Acid (Vitamin C) 500 MG chewable tablet, Chew 2 (Two) Times a Day., Disp: , Rfl:   •  atorvastatin (LIPITOR) 10 MG tablet, Take  by mouth., Disp: , Rfl:   •  Combigan 0.2-0.5 % ophthalmic solution, , Disp: , Rfl:   •  Continuous Blood Gluc Sensor (Dexcom G6 Sensor), 1 each Take As Directed., Disp: 9 each, Rfl: 3  •  Continuous Blood Gluc Transmit (Dexcom G6 Transmitter) misc, 1 each Take As Directed., Disp: 1 each, Rfl: 3  •  glucose blood (FREESTYLE LITE) test strip, Check glucose 6 to 7 times daily for a total of 600 strips for a 90 day supply, E10.9, Disp: 600 each, Rfl: 3  •  Insulin Disposable Pump (Omnipod 5 G6 Intro, Gen 5,) kit, 1 each Daily., Disp: 1 kit, Rfl: 0  •  Insulin Disposable Pump (Omnipod 5 G6 Pod, Gen 5,) misc, 1 each Every 3  (Three) Days., Disp: 30 each, Rfl: 3  •  Multiple Vitamins-Minerals (MULTIVITAMIN ADULT PO), Take  by mouth., Disp: , Rfl:   •  NovoLOG 100 UNIT/ML injection, INJECT 50 UNITS            SUBCUTANEOUSLY INTO THE    APPROPRIATE AREA AS        DIRECTED DAILY (Patient taking differently: Omni Pod usage ...MDD of 75 units), Disp: 50 mL, Rfl: 1  •  TADALAFIL PO, Take 20 mg by mouth. yanna, Disp: , Rfl:   •  terbinafine (lamiSIL) 250 MG tablet, Take 1 tablet by mouth Daily., Disp: 84 tablet, Rfl: 0  Patient Active Problem List    Diagnosis    • Thyroid nodule [E04.1]    • Erectile dysfunction [N52.9]    • Diabetic retinopathy associated with type 1 diabetes mellitus (HCC) [E10.319]    • Benign prostatic hyperplasia with urinary obstruction [N40.1, N13.8]    • Dyslipidemia [E78.5]    • Type 1 diabetes mellitus (HCC) [E10.9]      Review of Systems   Constitutional: Negative for activity change, appetite change and unexpected weight change.   HENT: Negative for congestion and rhinorrhea.    Eyes: Negative for visual disturbance.   Respiratory: Negative for cough and shortness of breath.    Cardiovascular: Negative for palpitations and leg swelling.   Gastrointestinal: Negative for constipation, diarrhea and nausea.   Genitourinary: Negative for hematuria.   Musculoskeletal: Negative for arthralgias, back pain, gait problem, joint swelling and myalgias.   Skin: Negative for color change, rash and wound.   Allergic/Immunologic: Negative for environmental allergies, food allergies and immunocompromised state.   Neurological: Negative for dizziness, weakness and light-headedness.   Psychiatric/Behavioral: Negative for confusion, decreased concentration, dysphoric mood and sleep disturbance. The patient is not nervous/anxious.      Social History     Socioeconomic History   • Marital status:    Tobacco Use   • Smoking status: Never   • Smokeless tobacco: Never   Vaping Use   • Vaping Use: Never used   Substance and Sexual  "Activity   • Alcohol use: No   • Drug use: No   • Sexual activity: Defer     Family History   Problem Relation Age of Onset   • Arthritis Mother    • Cancer Mother    • Hypertension Mother    • Heart disease Father    • Diabetes Father    • Cancer Sister    • Hypertension Brother    • Diabetes Brother      /60   Pulse 58   Ht 172.7 cm (68\")   Wt 67.3 kg (148 lb 6.4 oz)   SpO2 99%   BMI 22.56 kg/m²   Physical Exam  Vitals and nursing note reviewed.   Constitutional:       Appearance: Normal appearance. He is well-developed.   HENT:      Head: Normocephalic and atraumatic.      Nose: Nose normal.   Eyes:      General: Lids are normal.      Conjunctiva/sclera: Conjunctivae normal.      Pupils: Pupils are equal, round, and reactive to light.   Neck:      Thyroid: No thyroid mass or thyromegaly.      Vascular: No carotid bruit.      Trachea: Trachea normal. No tracheal deviation.   Cardiovascular:      Rate and Rhythm: Normal rate and regular rhythm.      Heart sounds: Normal heart sounds. No murmur heard.    No friction rub. No gallop.   Pulmonary:      Effort: Pulmonary effort is normal. No respiratory distress.      Breath sounds: Normal breath sounds. No wheezing.   Musculoskeletal:         General: No deformity. Normal range of motion.      Cervical back: Normal range of motion and neck supple.   Lymphadenopathy:      Cervical: No cervical adenopathy.   Skin:     General: Skin is warm and dry.      Findings: No erythema or rash.      Nails: There is no clubbing.   Neurological:      Mental Status: He is alert and oriented to person, place, and time.      Cranial Nerves: No cranial nerve deficit.      Deep Tendon Reflexes: Reflexes are normal and symmetric. Reflexes normal.   Psychiatric:         Speech: Speech normal.         Behavior: Behavior normal.         Thought Content: Thought content normal.         Judgment: Judgment normal.       Results for orders placed or performed in visit on 03/09/23   POC " Glycosylated Hemoglobin (Hb A1C)    Specimen: Blood   Result Value Ref Range    Hemoglobin A1C 7.3 %    Lot Number [id number]     Expiration Date 11/21/2024    POC Glucose, Blood    Specimen: Blood   Result Value Ref Range    Glucose 214 (A) 70 - 130 mg/dL    Lot Number [id number]     Expiration Date 10/14/2023      Diagnoses and all orders for this visit:    1. 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, macular edema presence unspecified, unspecified retinopathy severity (HCC) (Primary)  Assessment & Plan:  Blood sugar and 90 day average sugar reviewed  Results for orders placed or performed in visit on 03/09/23   POC Glycosylated Hemoglobin (Hb A1C)    Specimen: Blood   Result Value Ref Range    Hemoglobin A1C 7.3 %    Lot Number [id number]     Expiration Date 11/21/2024    POC Glucose, Blood    Specimen: Blood   Result Value Ref Range    Glucose 214 (A) 70 - 130 mg/dL    Lot Number [id number]     Expiration Date 10/14/2023      Pump downloaded and reviewed 21 days of sensor data  Is utd with eye exam- extensive eye disease followed in [place]  Ur alb neg  Foot - toenail fungus  rx lamisil- pros and cons discussed  F/u 3-4 months     Orders:  -     POC Glycosylated Hemoglobin (Hb A1C)  -     POC Glucose, Blood    2. Dyslipidemia  Assessment & Plan:  LDL in good range taking lipitor 10 mg daily       Other orders  -     terbinafine (lamiSIL) 250 MG tablet; Take 1 tablet by mouth Daily.  Dispense: 84 tablet; Refill: 0  Return in about 3 months (around 6/9/2023) for Recheck.    Aracely Allison MD  Signed Aracely Allison MD

## 2023-03-09 NOTE — ASSESSMENT & PLAN NOTE
Blood sugar and 90 day average sugar reviewed  Results for orders placed or performed in visit on 03/09/23   POC Glycosylated Hemoglobin (Hb A1C)    Specimen: Blood   Result Value Ref Range    Hemoglobin A1C 7.3 %    Lot Number [id number]     Expiration Date 11/21/2024    POC Glucose, Blood    Specimen: Blood   Result Value Ref Range    Glucose 214 (A) 70 - 130 mg/dL    Lot Number [id number]     Expiration Date 10/14/2023      Pump downloaded and reviewed 21 days of sensor data  Is utd with eye exam- extensive eye disease followed in [facility] alb neg  Foot - toenail fungus  rx lamisil- pros and cons discussed  F/u 3-4 months

## 2023-06-13 ENCOUNTER — OFFICE VISIT (OUTPATIENT)
Dept: ENDOCRINOLOGY | Facility: CLINIC | Age: 58
End: 2023-06-13
Payer: COMMERCIAL

## 2023-06-13 VITALS
DIASTOLIC BLOOD PRESSURE: 70 MMHG | SYSTOLIC BLOOD PRESSURE: 124 MMHG | HEART RATE: 73 BPM | WEIGHT: 146 LBS | HEIGHT: 68 IN | OXYGEN SATURATION: 99 % | BODY MASS INDEX: 22.13 KG/M2

## 2023-06-13 DIAGNOSIS — R73.9 HYPERGLYCEMIA: ICD-10-CM

## 2023-06-13 DIAGNOSIS — E10.319 TYPE 1 DIABETES MELLITUS WITH RETINOPATHY OF BOTH EYES, MACULAR EDEMA PRESENCE UNSPECIFIED, UNSPECIFIED RETINOPATHY SEVERITY: Primary | ICD-10-CM

## 2023-06-13 DIAGNOSIS — E78.5 DYSLIPIDEMIA: ICD-10-CM

## 2023-06-13 PROBLEM — E10.3599 TYPE 1 DIABETES MELLITUS WITH PROLIFERATIVE RETINOPATHY: Status: ACTIVE | Noted: 2017-04-18

## 2023-06-13 LAB
ALBUMIN SERPL-MCNC: 4.2 G/DL (ref 3.5–5.2)
ALBUMIN/GLOB SERPL: 1.6 G/DL
ALP SERPL-CCNC: 82 U/L (ref 39–117)
ALT SERPL W P-5'-P-CCNC: 15 U/L (ref 1–41)
ANION GAP SERPL CALCULATED.3IONS-SCNC: 8.8 MMOL/L (ref 5–15)
AST SERPL-CCNC: 25 U/L (ref 1–40)
BILIRUB SERPL-MCNC: 0.4 MG/DL (ref 0–1.2)
BUN SERPL-MCNC: 19 MG/DL (ref 6–20)
BUN/CREAT SERPL: 21.8 (ref 7–25)
CALCIUM SPEC-SCNC: 10 MG/DL (ref 8.6–10.5)
CHLORIDE SERPL-SCNC: 101 MMOL/L (ref 98–107)
CHOLEST SERPL-MCNC: 133 MG/DL (ref 0–200)
CO2 SERPL-SCNC: 30.2 MMOL/L (ref 22–29)
CREAT SERPL-MCNC: 0.87 MG/DL (ref 0.76–1.27)
EGFRCR SERPLBLD CKD-EPI 2021: 99.4 ML/MIN/1.73
EXPIRATION DATE: ABNORMAL
EXPIRATION DATE: NORMAL
GLOBULIN UR ELPH-MCNC: 2.6 GM/DL
GLUCOSE BLDC GLUCOMTR-MCNC: 276 MG/DL (ref 70–130)
GLUCOSE SERPL-MCNC: 166 MG/DL (ref 65–99)
HBA1C MFR BLD: 7.4 %
HDLC SERPL-MCNC: 50 MG/DL (ref 40–60)
LDLC SERPL CALC-MCNC: 69 MG/DL (ref 0–100)
LDLC/HDLC SERPL: 1.39 {RATIO}
Lab: ABNORMAL
Lab: NORMAL
POTASSIUM SERPL-SCNC: 4.1 MMOL/L (ref 3.5–5.2)
PROT SERPL-MCNC: 6.8 G/DL (ref 6–8.5)
SODIUM SERPL-SCNC: 140 MMOL/L (ref 136–145)
TRIGL SERPL-MCNC: 68 MG/DL (ref 0–150)
VLDLC SERPL-MCNC: 14 MG/DL (ref 5–40)

## 2023-06-13 PROCEDURE — 99214 OFFICE O/P EST MOD 30 MIN: CPT | Performed by: INTERNAL MEDICINE

## 2023-06-13 PROCEDURE — 95251 CONT GLUC MNTR ANALYSIS I&R: CPT | Performed by: INTERNAL MEDICINE

## 2023-06-13 PROCEDURE — 80061 LIPID PANEL: CPT | Performed by: INTERNAL MEDICINE

## 2023-06-13 PROCEDURE — 82043 UR ALBUMIN QUANTITATIVE: CPT | Performed by: INTERNAL MEDICINE

## 2023-06-13 PROCEDURE — 80053 COMPREHEN METABOLIC PANEL: CPT | Performed by: INTERNAL MEDICINE

## 2023-06-13 PROCEDURE — 84443 ASSAY THYROID STIM HORMONE: CPT | Performed by: INTERNAL MEDICINE

## 2023-06-13 PROCEDURE — 84439 ASSAY OF FREE THYROXINE: CPT | Performed by: INTERNAL MEDICINE

## 2023-06-13 PROCEDURE — 82570 ASSAY OF URINE CREATININE: CPT | Performed by: INTERNAL MEDICINE

## 2023-06-13 PROCEDURE — 83036 HEMOGLOBIN GLYCOSYLATED A1C: CPT | Performed by: INTERNAL MEDICINE

## 2023-06-13 PROCEDURE — 86337 INSULIN ANTIBODIES: CPT | Performed by: INTERNAL MEDICINE

## 2023-06-13 RX ORDER — INSULIN ASPART 100 [IU]/ML
INJECTION, SOLUTION INTRAVENOUS; SUBCUTANEOUS
Qty: 50 ML | Refills: 3 | Status: SHIPPED | OUTPATIENT
Start: 2023-06-13

## 2023-06-13 NOTE — ASSESSMENT & PLAN NOTE
Diagnosed age 8   Complicated retinopathy followed by retina specialist, also glaucoma specialist  Blood sugar and 90 day average sugar reviewed  Results for orders placed or performed in visit on 06/13/23   POC Glycosylated Hemoglobin (Hb A1C)    Specimen: Blood   Result Value Ref Range    Hemoglobin A1C 7.4 %    Lot Number [id number]     Expiration Date 02-    POC Glucose, Blood    Specimen: Blood   Result Value Ref Range    Glucose 276 (A) 70 - 130 mg/dL    Lot Number [id number]     Expiration Date 11-      Pump downloaded and reviewed along with sensor data  12 days of sensor data reviewed  Some lower afternoon sugars  Some higher pp sugars- reviewed and pump is suspending with lower trend  Options for correction discussed   Is utd with eye exam  No foot lesion   Ur alb due NOV  F/u 3-4 months

## 2023-06-13 NOTE — PROGRESS NOTES
Jonas Antonio 58 y.o.    CC:Follow-up and Diabetes (TYpe II, eye exam in [place] June 2022 - Dr Christopher Bethea/)     [place]:Follow-up and Diabetes (TYpe II, eye exam in [place] June 2022 - Dr Christopher Bethea/)   Using op5  Blood sugar and 90 day average sugar reviewed  Results for orders placed or performed in visit on 06/13/23   Comprehensive Metabolic Panel    Specimen: Arm, Left; Blood   Result Value Ref Range    Glucose 166 (H) 65 - 99 mg/dL    BUN 19 6 - 20 mg/dL    Creatinine 0.87 0.76 - 1.27 mg/dL    Sodium 140 136 - 145 mmol/L    Potassium 4.1 3.5 - 5.2 mmol/L    Chloride 101 98 - 107 mmol/L    CO2 30.2 (H) 22.0 - 29.0 mmol/L    Calcium 10.0 8.6 - 10.5 mg/dL    Total Protein 6.8 6.0 - 8.5 g/dL    Albumin 4.2 3.5 - 5.2 g/dL    ALT (SGPT) 15 1 - 41 U/L    AST (SGOT) 25 1 - 40 U/L    Alkaline Phosphatase 82 39 - 117 U/L    Total Bilirubin 0.4 0.0 - 1.2 mg/dL    Globulin 2.6 gm/dL    A/G Ratio 1.6 g/dL    BUN/Creatinine Ratio 21.8 7.0 - 25.0    Anion Gap 8.8 5.0 - 15.0 mmol/L    eGFR 99.4 >60.0 mL/min/1.73   Lipid Panel    Specimen: Arm, Left; Blood   Result Value Ref Range    Total Cholesterol 133 0 - 200 mg/dL    Triglycerides 68 0 - 150 mg/dL    HDL Cholesterol 50 40 - 60 mg/dL    LDL Cholesterol  69 0 - 100 mg/dL    VLDL Cholesterol 14 5 - 40 mg/dL    LDL/HDL Ratio 1.39    T4, Free    Specimen: Arm, Left; Blood   Result Value Ref Range    Free T4 1.27 0.93 - 1.70 ng/dL   TSH    Specimen: Arm, Left; Blood   Result Value Ref Range    TSH 1.170 0.270 - 4.200 uIU/mL   Microalbumin / Creatinine Urine Ratio - Urine, Clean Catch    Specimen: Urine, Clean Catch   Result Value Ref Range    Microalbumin/Creatinine Ratio      Creatinine, Urine 154.1 mg/dL    Microalbumin, Urine <1.2 mg/dL   POC Glycosylated Hemoglobin (Hb A1C)    Specimen: Blood   Result Value Ref Range    Hemoglobin A1C 7.4 %    Lot Number [id number]     Expiration Date 02-    POC Glucose, Blood    Specimen: Blood   Result Value Ref Range     Glucose 276 (A) 70 - 130 mg/dL    Lot Number [id number]     Expiration Date 11-      Also high chol- taking lipitor 10 mg daily   Having low sugar 4-5 pm   Reviewed sensor and pump download  Discussed shifting to exercise mode if low around dinner- typically 1 hour prior to anticipated low  Pump is suspended a lot due to low or anticipated low  He is going in to manual mode for bolus at lunch and dinner, higher basal rate and occasionally he forgets to go back to auto mode  High sugar and current sugar reviewed  Uncontrolled diabetes discussed     No Known Allergies    Current Outpatient Medications:     Ascorbic Acid (Vitamin C) 500 MG chewable tablet, Chew 2 (Two) Times a Day., Disp: , Rfl:     atorvastatin (LIPITOR) 10 MG tablet, Take  by mouth., Disp: , Rfl:     Combigan 0.2-0.5 % ophthalmic solution, Administer 1 drop to both eyes Every 12 (Twelve) Hours., Disp: , Rfl:     Continuous Blood Gluc Sensor (Dexcom G6 Sensor), 1 each Take As Directed., Disp: 9 each, Rfl: 3    Continuous Blood Gluc Transmit (Dexcom G6 Transmitter) misc, 1 each Take As Directed., Disp: 1 each, Rfl: 3    glucose blood (FREESTYLE LITE) test strip, Check glucose 6 to 7 times daily for a total of 600 strips for a 90 day supply, E10.9, Disp: 600 each, Rfl: 3    Insulin Aspart (NovoLOG) 100 UNIT/ML injection, Omni Pod usage ...MDD of 75 units, Disp: 50 mL, Rfl: 3    Insulin Disposable Pump (Omnipod 5 G6 Intro, Gen 5,) kit, 1 each Daily., Disp: 1 kit, Rfl: 0    Insulin Disposable Pump (Omnipod 5 G6 Pod, Gen 5,) misc, 1 each Every 3 (Three) Days., Disp: 30 each, Rfl: 3    Multiple Vitamins-Minerals (MULTIVITAMIN ADULT PO), Take  by mouth., Disp: , Rfl:     TADALAFIL PO, Take 20 mg by mouth. yanna, Disp: , Rfl:     terbinafine (lamiSIL) 250 MG tablet, Take 1 tablet by mouth Daily., Disp: 84 tablet, Rfl: 0  Patient Active Problem List    Diagnosis     Thyroid nodule [E04.1]     Erectile dysfunction [N52.9]     Diabetic retinopathy  "associated with type 1 diabetes mellitus [E10.319]     Benign prostatic hyperplasia with urinary obstruction [N40.1, N13.8]     Dyslipidemia [E78.5]     Type 1 diabetes mellitus with proliferative retinopathy [E10.3599]      Review of Systems   Constitutional:  Negative for activity change, appetite change and unexpected weight change.   HENT:  Negative for congestion and rhinorrhea.    Eyes:  Negative for visual disturbance.   Respiratory:  Negative for cough and shortness of breath.    Cardiovascular:  Negative for palpitations and leg swelling.   Gastrointestinal:  Negative for constipation, diarrhea and nausea.   Genitourinary:  Negative for hematuria.   Musculoskeletal:  Negative for arthralgias, back pain, gait problem, joint swelling and myalgias.   Skin:  Negative for color change, rash and wound.   Allergic/Immunologic: Negative for environmental allergies, food allergies and immunocompromised state.   Neurological:  Negative for dizziness, weakness and light-headedness.   Psychiatric/Behavioral:  Negative for confusion, decreased concentration, dysphoric mood and sleep disturbance. The patient is not nervous/anxious.    Social History     Socioeconomic History    Marital status:    Tobacco Use    Smoking status: Never    Smokeless tobacco: Never   Vaping Use    Vaping Use: Never used   Substance and Sexual Activity    Alcohol use: No    Drug use: No    Sexual activity: Defer     Family History   Problem Relation Age of Onset    Arthritis Mother     Cancer Mother     Hypertension Mother     Heart disease Father     Diabetes Father     Cancer Sister     Hypertension Brother     Diabetes Brother      /70   Pulse 73   Ht 172.7 cm (68\")   Wt 66.2 kg (146 lb)   SpO2 99%   BMI 22.20 kg/m²   Physical Exam  Vitals and nursing note reviewed.   Constitutional:       Appearance: Normal appearance. He is well-developed.   HENT:      Head: Normocephalic and atraumatic.   Eyes:      General: Lids are " normal.      Extraocular Movements: Extraocular movements intact.      Conjunctiva/sclera: Conjunctivae normal.      Pupils: Pupils are equal, round, and reactive to light.   Neck:      Thyroid: No thyroid mass or thyromegaly.      Vascular: No carotid bruit.      Trachea: Trachea normal. No tracheal deviation.   Cardiovascular:      Rate and Rhythm: Normal rate and regular rhythm.      Pulses: Normal pulses.      Heart sounds: Normal heart sounds. No murmur heard.    No friction rub. No gallop.   Pulmonary:      Effort: Pulmonary effort is normal. No respiratory distress.      Breath sounds: Normal breath sounds. No wheezing.   Musculoskeletal:         General: No deformity. Normal range of motion.      Cervical back: Normal range of motion and neck supple.   Lymphadenopathy:      Cervical: No cervical adenopathy.   Skin:     General: Skin is warm and dry.      Findings: No erythema or rash.      Nails: There is no clubbing.   Neurological:      General: No focal deficit present.      Mental Status: He is alert and oriented to person, place, and time.      Cranial Nerves: No cranial nerve deficit.      Deep Tendon Reflexes: Reflexes are normal and symmetric. Reflexes normal.   Psychiatric:         Mood and Affect: Mood normal.         Speech: Speech normal.         Behavior: Behavior normal.         Thought Content: Thought content normal.         Judgment: Judgment normal.     Results for orders placed or performed in visit on 06/13/23   Comprehensive Metabolic Panel    Specimen: Arm, Left; Blood   Result Value Ref Range    Glucose 166 (H) 65 - 99 mg/dL    BUN 19 6 - 20 mg/dL    Creatinine 0.87 0.76 - 1.27 mg/dL    Sodium 140 136 - 145 mmol/L    Potassium 4.1 3.5 - 5.2 mmol/L    Chloride 101 98 - 107 mmol/L    CO2 30.2 (H) 22.0 - 29.0 mmol/L    Calcium 10.0 8.6 - 10.5 mg/dL    Total Protein 6.8 6.0 - 8.5 g/dL    Albumin 4.2 3.5 - 5.2 g/dL    ALT (SGPT) 15 1 - 41 U/L    AST (SGOT) 25 1 - 40 U/L    Alkaline  Phosphatase 82 39 - 117 U/L    Total Bilirubin 0.4 0.0 - 1.2 mg/dL    Globulin 2.6 gm/dL    A/G Ratio 1.6 g/dL    BUN/Creatinine Ratio 21.8 7.0 - 25.0    Anion Gap 8.8 5.0 - 15.0 mmol/L    eGFR 99.4 >60.0 mL/min/1.73   Lipid Panel    Specimen: Arm, Left; Blood   Result Value Ref Range    Total Cholesterol 133 0 - 200 mg/dL    Triglycerides 68 0 - 150 mg/dL    HDL Cholesterol 50 40 - 60 mg/dL    LDL Cholesterol  69 0 - 100 mg/dL    VLDL Cholesterol 14 5 - 40 mg/dL    LDL/HDL Ratio 1.39    T4, Free    Specimen: Arm, Left; Blood   Result Value Ref Range    Free T4 1.27 0.93 - 1.70 ng/dL   TSH    Specimen: Arm, Left; Blood   Result Value Ref Range    TSH 1.170 0.270 - 4.200 uIU/mL   Microalbumin / Creatinine Urine Ratio - Urine, Clean Catch    Specimen: Urine, Clean Catch   Result Value Ref Range    Microalbumin/Creatinine Ratio      Creatinine, Urine 154.1 mg/dL    Microalbumin, Urine <1.2 mg/dL   POC Glycosylated Hemoglobin (Hb A1C)    Specimen: Blood   Result Value Ref Range    Hemoglobin A1C 7.4 %    Lot Number [id number]     Expiration Date 02-    POC Glucose, Blood    Specimen: Blood   Result Value Ref Range    Glucose 276 (A) 70 - 130 mg/dL    Lot Number [id number]     Expiration Date 11-      Diagnoses and all orders for this visit:    1. 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, macular edema presence unspecified, unspecified retinopathy severity (Primary)  Assessment & Plan:  Diagnosed age 8   Complicated retinopathy followed by retina specialist, also glaucoma specialist  Blood sugar and 90 day average sugar reviewed  Results for orders placed or performed in visit on 06/13/23   POC Glycosylated Hemoglobin (Hb A1C)    Specimen: Blood   Result Value Ref Range    Hemoglobin A1C 7.4 %    Lot Number [id number]     Expiration Date 02-    POC Glucose, Blood    Specimen: Blood   Result Value Ref Range    Glucose 276 (A) 70 - 130 mg/dL    Lot Number [id number]     Expiration Date  11-      Pump downloaded and reviewed along with sensor data  12 days of sensor data reviewed  Some lower afternoon sugars  Some higher pp sugars- reviewed and pump is suspending with lower trend  Options for correction discussed   Is utd with eye exam  No foot lesion   Ur alb due NOV  F/u 3-4 months       Orders:  -     POC Glycosylated Hemoglobin (Hb A1C)  -     POC Glucose, Blood  -     Comprehensive Metabolic Panel; Future  -     Lipid Panel; Future  -     T4, Free; Future  -     TSH; Future  -     Microalbumin / Creatinine Urine Ratio - Urine, Clean Catch; Future  -     Insulin Antibody; Future  -     Insulin Aspart (NovoLOG) 100 UNIT/ML injection; Omni Pod usage ...MDD of 75 units  Dispense: 50 mL; Refill: 3  -     Comprehensive Metabolic Panel  -     Lipid Panel  -     T4, Free  -     TSH  -     Microalbumin / Creatinine Urine Ratio - Urine, Clean Catch  -     Insulin Antibody    2. Hyperglycemia  Assessment & Plan:  Diagnosed age 8   Complicated retinopathy followed by retina specialist, also glaucoma specialist  Blood sugar and 90 day average sugar reviewed  Results for orders placed or performed in visit on 06/13/23   POC Glycosylated Hemoglobin (Hb A1C)    Specimen: Blood   Result Value Ref Range    Hemoglobin A1C 7.4 %    Lot Number [id number]     Expiration Date 02-    POC Glucose, Blood    Specimen: Blood   Result Value Ref Range    Glucose 276 (A) 70 - 130 mg/dL    Lot Number [id number]     Expiration Date 11-      Pump downloaded and reviewed along with sensor data  12 days of sensor data reviewed  Some lower afternoon sugars  Some higher pp sugars- reviewed and pump is suspending with lower trend  Options for correction discussed   Is utd with eye exam  No foot lesion   Ur alb due NOV  F/u 3-4 months         3. Dyslipidemia  Assessment & Plan:  Continue low fat diet and lipitor 10 mg daily   Last ldl 64  No change for now       Return in about 3 months (around 9/13/2023) for  Edwina.    Aracely Allison MD  Signed Aracely Allison MD

## 2023-06-14 LAB
ALBUMIN UR-MCNC: <1.2 MG/DL
CREAT UR-MCNC: 154.1 MG/DL
MICROALBUMIN/CREAT UR: NORMAL MG/G{CREAT}
T4 FREE SERPL-MCNC: 1.27 NG/DL (ref 0.93–1.7)
TSH SERPL DL<=0.05 MIU/L-ACNC: 1.17 UIU/ML (ref 0.27–4.2)

## 2023-06-19 LAB — INSULIN AB SER-ACNC: <5 UU/ML

## 2023-09-05 DIAGNOSIS — E10.319 TYPE 1 DIABETES MELLITUS WITH RETINOPATHY OF BOTH EYES, MACULAR EDEMA PRESENCE UNSPECIFIED, UNSPECIFIED RETINOPATHY SEVERITY: ICD-10-CM

## 2023-09-05 RX ORDER — PROCHLORPERAZINE 25 MG/1
SUPPOSITORY RECTAL
Qty: 1 EACH | Refills: 3 | Status: SHIPPED | OUTPATIENT
Start: 2023-09-05

## 2023-09-05 RX ORDER — PROCHLORPERAZINE 25 MG/1
SUPPOSITORY RECTAL
Qty: 9 EACH | Refills: 3 | Status: SHIPPED | OUTPATIENT
Start: 2023-09-05

## 2023-09-05 NOTE — TELEPHONE ENCOUNTER
Rx Refill Note  Requested Prescriptions     Pending Prescriptions Disp Refills    Continuous Blood Gluc Sensor (Dexcom G6 Sensor) [Pharmacy Med Name: DEXCOM G6 MIS SENSOR] 9 each 3     Sig: USE AS DIRECTED    Continuous Blood Gluc Transmit (Dexcom G6 Transmitter) misc [Pharmacy Med Name: DEXCOM G6 MIS TRANSMIT] 1 each 3     Sig: USE AS DIRECTED          Last office visit with prescribing clinician: 8/24/2022     Next office visit with prescribing clinician: Visit date not found         eSlina Funez MA  09/05/23, 11:47 EDT

## 2023-09-14 RX ORDER — INSULIN PMP CART,AUT,G6/7,CNTR
EACH SUBCUTANEOUS
Qty: 30 EACH | Refills: 3 | Status: SHIPPED | OUTPATIENT
Start: 2023-09-14 | End: 2023-09-18 | Stop reason: SDUPTHER

## 2023-09-18 ENCOUNTER — OFFICE VISIT (OUTPATIENT)
Dept: ENDOCRINOLOGY | Facility: CLINIC | Age: 58
End: 2023-09-18
Payer: COMMERCIAL

## 2023-09-18 VITALS
HEIGHT: 68 IN | BODY MASS INDEX: 22.37 KG/M2 | WEIGHT: 147.6 LBS | HEART RATE: 68 BPM | OXYGEN SATURATION: 100 % | DIASTOLIC BLOOD PRESSURE: 62 MMHG | SYSTOLIC BLOOD PRESSURE: 118 MMHG

## 2023-09-18 DIAGNOSIS — E78.5 DYSLIPIDEMIA: ICD-10-CM

## 2023-09-18 DIAGNOSIS — E10.3593 TYPE 1 DIABETES MELLITUS WITH PROLIFERATIVE RETINOPATHY OF BOTH EYES, MACULAR EDEMA PRESENCE UNSPECIFIED, UNSPECIFIED PROLIFERATIVE RETINOPATHY TYPE: Primary | ICD-10-CM

## 2023-09-18 DIAGNOSIS — E04.1 THYROID NODULE: ICD-10-CM

## 2023-09-18 LAB
EXPIRATION DATE: ABNORMAL
EXPIRATION DATE: NORMAL
GLUCOSE BLDC GLUCOMTR-MCNC: 165 MG/DL (ref 70–130)
HBA1C MFR BLD: 7.1 %
Lab: ABNORMAL
Lab: NORMAL

## 2023-09-18 PROCEDURE — 83036 HEMOGLOBIN GLYCOSYLATED A1C: CPT | Performed by: INTERNAL MEDICINE

## 2023-09-18 PROCEDURE — 99214 OFFICE O/P EST MOD 30 MIN: CPT | Performed by: INTERNAL MEDICINE

## 2023-09-18 PROCEDURE — 82947 ASSAY GLUCOSE BLOOD QUANT: CPT | Performed by: INTERNAL MEDICINE

## 2023-09-18 RX ORDER — INSULIN PMP CART,AUT,G6/7,CNTR
1 EACH SUBCUTANEOUS
Qty: 30 EACH | Refills: 1 | Status: SHIPPED | OUTPATIENT
Start: 2023-09-18

## 2023-09-18 NOTE — PROGRESS NOTES
Jonas Antonio 58 y.o.  CC:Follow-up and Diabetes (Type I, eye exam 9-6-23 in [place], [place])    [place]: Follow-up and Diabetes (Type I, eye exam 9-6-23 in [place], [place])    Is eating low fat diet and taking lipitor 10 mg daily   Bp is good today   No foot problems- still stable nail changes  Would like to use livongo - discussed  Results for orders placed or performed in visit on 09/18/23   POC Glycosylated Hemoglobin (Hb A1C)    Specimen: Blood   Result Value Ref Range    Hemoglobin A1C 7.1 %    Lot Number [id number]     Expiration Date 05/07/2025    POC Glucose, Blood    Specimen: Blood   Result Value Ref Range    Glucose 165 (A) 70 - 130 mg/dL    Lot Number [id number]     Expiration Date 03/17/2024      Average sugar is 150- down from 165  Is utd with eye exam  No neuropathy   Is testing sugars once daily   Bp is good   Downloaded pump and sensor and reviewed  Overall improved blood sugar control   Higher evening sugars- often does not bolus for snacks    No Known Allergies    Current Outpatient Medications:     Ascorbic Acid (Vitamin C) 500 MG chewable tablet, Chew 2 (Two) Times a Day., Disp: , Rfl:     atorvastatin (LIPITOR) 10 MG tablet, Take  by mouth., Disp: , Rfl:     Combigan 0.2-0.5 % ophthalmic solution, Administer 1 drop to both eyes Every 12 (Twelve) Hours., Disp: , Rfl:     Continuous Blood Gluc Sensor (Dexcom G6 Sensor), USE AS DIRECTED, Disp: 9 each, Rfl: 3    Continuous Blood Gluc Transmit (Dexcom G6 Transmitter) misc, USE AS DIRECTED, Disp: 1 each, Rfl: 3    glucose blood (FREESTYLE LITE) test strip, Check glucose 6 to 7 times daily for a total of 600 strips for a 90 day supply, E10.9, Disp: 600 each, Rfl: 3    Insulin Aspart (NovoLOG) 100 UNIT/ML injection, Omni Pod usage ...MDD of 75 units, Disp: 50 mL, Rfl: 3    Insulin Disposable Pump (Omnipod 5 G6 Intro, Gen 5,) kit, 1 each Daily., Disp: 1 kit, Rfl: 0    Insulin Disposable Pump (Omnipod 5 G6 Pod, Gen 5,) misc, USE 1 POD EVERY 3 DAYS, Disp: 30  each, Rfl: 3    Multiple Vitamins-Minerals (MULTIVITAMIN ADULT PO), Take  by mouth., Disp: , Rfl:     TADALAFIL PO, Take 20 mg by mouth. yanna, Disp: , Rfl:   Patient Active Problem List    Diagnosis     Thyroid nodule [E04.1]     Erectile dysfunction [N52.9]     Diabetic retinopathy associated with type 1 diabetes mellitus [E10.319]     Benign prostatic hyperplasia with urinary obstruction [N40.1, N13.8]     Dyslipidemia [E78.5]     Type 1 diabetes mellitus with proliferative retinopathy [E10.3599]      Review of Systems   Constitutional:  Negative for activity change, appetite change and unexpected weight change.   HENT:  Negative for congestion and rhinorrhea.    Eyes:  Negative for visual disturbance.   Respiratory:  Negative for cough and shortness of breath.    Cardiovascular:  Negative for palpitations and leg swelling.   Gastrointestinal:  Negative for constipation, diarrhea and nausea.   Genitourinary:  Negative for hematuria.   Musculoskeletal:  Negative for arthralgias, back pain, gait problem, joint swelling and myalgias.   Skin:  Negative for color change, rash and wound.   Allergic/Immunologic: Negative for environmental allergies, food allergies and immunocompromised state.   Neurological:  Negative for dizziness, weakness and light-headedness.   Psychiatric/Behavioral:  Negative for confusion, decreased concentration, dysphoric mood and sleep disturbance. The patient is not nervous/anxious.    Social History     Socioeconomic History    Marital status:    Tobacco Use    Smoking status: Never    Smokeless tobacco: Never   Vaping Use    Vaping Use: Never used   Substance and Sexual Activity    Alcohol use: No    Drug use: No    Sexual activity: Defer     Family History   Problem Relation Age of Onset    Arthritis Mother     Cancer Mother     Hypertension Mother     Heart disease Father     Diabetes Father     Cancer Sister     Hypertension Brother     Diabetes Brother      /62   Pulse 68  "  Ht 172.7 cm (68\")   Wt 67 kg (147 lb 9.6 oz)   SpO2 100%   BMI 22.44 kg/m²   Physical Exam  Vitals and nursing note reviewed.   Constitutional:       Appearance: Normal appearance. He is well-developed.   HENT:      Head: Normocephalic and atraumatic.   Eyes:      General: Lids are normal.      Extraocular Movements: Extraocular movements intact.      Conjunctiva/sclera: Conjunctivae normal.      Pupils: Pupils are equal, round, and reactive to light.   Neck:      Thyroid: No thyroid mass or thyromegaly.      Vascular: No carotid bruit.      Trachea: Trachea normal. No tracheal deviation.   Cardiovascular:      Rate and Rhythm: Normal rate and regular rhythm.      Pulses: Normal pulses.      Heart sounds: Normal heart sounds. No murmur heard.    No friction rub. No gallop.   Pulmonary:      Effort: Pulmonary effort is normal. No respiratory distress.      Breath sounds: Normal breath sounds. No wheezing.   Musculoskeletal:         General: No deformity. Normal range of motion.      Cervical back: Normal range of motion and neck supple.   Lymphadenopathy:      Cervical: No cervical adenopathy.   Skin:     General: Skin is warm and dry.      Findings: No erythema or rash.      Nails: There is no clubbing.   Neurological:      General: No focal deficit present.      Mental Status: He is alert and oriented to person, place, and time.      Cranial Nerves: No cranial nerve deficit.      Deep Tendon Reflexes: Reflexes are normal and symmetric. Reflexes normal.   Psychiatric:         Mood and Affect: Mood normal.         Speech: Speech normal.         Behavior: Behavior normal.         Thought Content: Thought content normal.         Judgment: Judgment normal.     Results for orders placed or performed in visit on 09/18/23   POC Glycosylated Hemoglobin (Hb A1C)    Specimen: Blood   Result Value Ref Range    Hemoglobin A1C 7.1 %    Lot Number [id number]     Expiration Date 05/07/2025    POC Glucose, Blood    Specimen: " Blood   Result Value Ref Range    Glucose 165 (A) 70 - 130 mg/dL    Lot Number [id number]     Expiration Date 03/17/2024      Diagnoses and all orders for this visit:    1. Type 1 diabetes mellitus with proliferative retinopathy of both eyes, macular edema presence unspecified, unspecified proliferative retinopathy type (Primary)  Assessment & Plan:  Blood sugar and 90 day average sugar reviewed  Results for orders placed or performed in visit on 09/18/23   POC Glycosylated Hemoglobin (Hb A1C)    Specimen: Blood   Result Value Ref Range    Hemoglobin A1C 7.1 %    Lot Number [id number]     Expiration Date 05/07/2025    POC Glucose, Blood    Specimen: Blood   Result Value Ref Range    Glucose 165 (A) 70 - 130 mg/dL    Lot Number [id number]     Expiration Date 03/17/2024      DM diagnosed at age 8  Doing well with omnipod pump   Tdd 30   Downloaded and reviewed blood sugars and higher evening sugars at times  I do agree that he would benefit from bolus prior to snacking in the evening  Alternatively he can lower correction factor to 55 and lower target to 130  Is utd with eye exam  No foot lesion   Ur alb neg  F/u 3-4 months     Orders:  -     POC Glycosylated Hemoglobin (Hb A1C)  -     POC Glucose, Blood    2. Dyslipidemia  Assessment & Plan:  Is eating low fat diet, taking lipitor 10 mg daily   Ldl at goal   Continue diet, medication      3. Thyroid nodule  Assessment & Plan:  1.4x1.2x0.6 cm left nodule - heterogenous by report   Mobile by exam without adenopathy   Has f/u u/s [facility]- benign biopsy x 2      Return in about 3 months (around 12/18/2023) for Recheck.    Aracely Allison MD  Signed Aracely Allison MD

## 2023-09-18 NOTE — ASSESSMENT & PLAN NOTE
Blood sugar and 90 day average sugar reviewed  Results for orders placed or performed in visit on 09/18/23   POC Glycosylated Hemoglobin (Hb A1C)    Specimen: Blood   Result Value Ref Range    Hemoglobin A1C 7.1 %    Lot Number [id number]     Expiration Date 05/07/2025    POC Glucose, Blood    Specimen: Blood   Result Value Ref Range    Glucose 165 (A) 70 - 130 mg/dL    Lot Number [id number]     Expiration Date 03/17/2024      DM diagnosed at age 8  Doing well with omnipod pump   Tdd 30   Downloaded and reviewed blood sugars and higher evening sugars at times  I do agree that he would benefit from bolus prior to snacking in the evening  Alternatively he can lower correction factor to 55 and lower target to 130  Is utd with eye exam  No foot lesion   Ur alb neg  F/u 3-4 months

## 2023-09-18 NOTE — ASSESSMENT & PLAN NOTE
1.4x1.2x0.6 cm left nodule - heterogenous by report   Mobile by exam without adenopathy   Has f/u u/s [facility]- benign biopsy x 2

## 2023-09-19 ENCOUNTER — PRIOR AUTHORIZATION (OUTPATIENT)
Dept: ENDOCRINOLOGY | Facility: CLINIC | Age: 58
End: 2023-09-19
Payer: COMMERCIAL

## 2023-09-19 ENCOUNTER — PATIENT MESSAGE (OUTPATIENT)
Dept: ENDOCRINOLOGY | Facility: CLINIC | Age: 58
End: 2023-09-19
Payer: COMMERCIAL

## 2023-09-19 NOTE — TELEPHONE ENCOUNTER
[facility]    Response:    [facility] has indicated that it is too soon to refill this medication at the pharmacy for your patient. If you need to renew an existing PA for your patient's medication, please reach out to [facility] directly at [phone]

## 2023-12-29 ENCOUNTER — OFFICE VISIT (OUTPATIENT)
Dept: ENDOCRINOLOGY | Facility: CLINIC | Age: 58
End: 2023-12-29
Payer: COMMERCIAL

## 2023-12-29 VITALS
DIASTOLIC BLOOD PRESSURE: 64 MMHG | SYSTOLIC BLOOD PRESSURE: 128 MMHG | WEIGHT: 150.8 LBS | BODY MASS INDEX: 22.85 KG/M2 | HEIGHT: 68 IN | OXYGEN SATURATION: 100 % | HEART RATE: 68 BPM

## 2023-12-29 DIAGNOSIS — M16.0 PRIMARY OSTEOARTHRITIS OF BOTH HIPS: ICD-10-CM

## 2023-12-29 DIAGNOSIS — E10.3593 TYPE 1 DIABETES MELLITUS WITH PROLIFERATIVE RETINOPATHY OF BOTH EYES, MACULAR EDEMA PRESENCE UNSPECIFIED, UNSPECIFIED PROLIFERATIVE RETINOPATHY TYPE: Primary | ICD-10-CM

## 2023-12-29 DIAGNOSIS — E04.1 THYROID NODULE: ICD-10-CM

## 2023-12-29 DIAGNOSIS — E78.5 DYSLIPIDEMIA: ICD-10-CM

## 2023-12-29 PROBLEM — M25.559 ARTHRALGIA OF HIP: Status: ACTIVE | Noted: 2023-12-08

## 2023-12-29 LAB
ALBUMIN SERPL-MCNC: 4 G/DL (ref 3.5–5.2)
ALBUMIN/GLOB SERPL: 1.7 G/DL
ALP SERPL-CCNC: 83 U/L (ref 39–117)
ALT SERPL W P-5'-P-CCNC: 18 U/L (ref 1–41)
ANION GAP SERPL CALCULATED.3IONS-SCNC: 8.8 MMOL/L (ref 5–15)
AST SERPL-CCNC: 20 U/L (ref 1–40)
BILIRUB SERPL-MCNC: 0.4 MG/DL (ref 0–1.2)
BUN SERPL-MCNC: 19 MG/DL (ref 6–20)
BUN/CREAT SERPL: 23.8 (ref 7–25)
CALCIUM SPEC-SCNC: 9.2 MG/DL (ref 8.6–10.5)
CHLORIDE SERPL-SCNC: 102 MMOL/L (ref 98–107)
CHOLEST SERPL-MCNC: 140 MG/DL (ref 0–200)
CO2 SERPL-SCNC: 27.2 MMOL/L (ref 22–29)
CREAT SERPL-MCNC: 0.8 MG/DL (ref 0.76–1.27)
EGFRCR SERPLBLD CKD-EPI 2021: 101.9 ML/MIN/1.73
EXPIRATION DATE: ABNORMAL
EXPIRATION DATE: ABNORMAL
GLOBULIN UR ELPH-MCNC: 2.3 GM/DL
GLUCOSE BLDC GLUCOMTR-MCNC: 155 MG/DL (ref 70–130)
GLUCOSE SERPL-MCNC: 218 MG/DL (ref 65–99)
HBA1C MFR BLD: 7.2 % (ref 4.5–5.7)
HDLC SERPL-MCNC: 53 MG/DL (ref 40–60)
LDLC SERPL CALC-MCNC: 75 MG/DL (ref 0–100)
LDLC/HDLC SERPL: 1.44 {RATIO}
Lab: ABNORMAL
Lab: ABNORMAL
POTASSIUM SERPL-SCNC: 4.7 MMOL/L (ref 3.5–5.2)
PROT SERPL-MCNC: 6.3 G/DL (ref 6–8.5)
SODIUM SERPL-SCNC: 138 MMOL/L (ref 136–145)
T4 FREE SERPL-MCNC: 1.3 NG/DL (ref 0.93–1.7)
TRIGL SERPL-MCNC: 54 MG/DL (ref 0–150)
TSH SERPL DL<=0.05 MIU/L-ACNC: 1.57 UIU/ML (ref 0.27–4.2)
VLDLC SERPL-MCNC: 12 MG/DL (ref 5–40)

## 2023-12-29 PROCEDURE — 80061 LIPID PANEL: CPT | Performed by: INTERNAL MEDICINE

## 2023-12-29 PROCEDURE — 80053 COMPREHEN METABOLIC PANEL: CPT | Performed by: INTERNAL MEDICINE

## 2023-12-29 PROCEDURE — 84439 ASSAY OF FREE THYROXINE: CPT | Performed by: INTERNAL MEDICINE

## 2023-12-29 PROCEDURE — 84443 ASSAY THYROID STIM HORMONE: CPT | Performed by: INTERNAL MEDICINE

## 2023-12-29 NOTE — ASSESSMENT & PLAN NOTE
Recent u/s reviewed   Remote biopsy benign with reduction in nodule size  Per u/s provider recommended 1 additional u/s and then can follow clinically   Last u/s nodule   Left inferior thyroid nodule, measured 1.34 x 0.43 x 1.41 cm (it was 1.39 x 0.60 x 1.21 cm 11/23/2021), solid cystic, isoechoic, heterogenous, ill defined border with few microcalcifications but no increased vascularity

## 2023-12-29 NOTE — ASSESSMENT & PLAN NOTE
Pain left with sitting to standing   Reduced ROM right - saw Dr Graham- said will need hip replacement in the future   Prescribed diclofenac

## 2023-12-29 NOTE — PROGRESS NOTES
Jonas Antonio 58 y.o.  CC:Follow-up, Diabetes (Type I, eye exam 9-6-23 in [facility]), and Hyperlipidemia    [place]: Follow-up, Diabetes (Type I, eye exam 9-6-23 in [facility]), and Hyperlipidemia    Blood sugar and 90 day average sugar reviewed  Results for orders placed or performed in visit on 12/29/23   POC Glycosylated Hemoglobin (Hb A1C)    Specimen: Blood   Result Value Ref Range    Hemoglobin A1C 7.2 (A) 4.5 - 5.7 %    Lot Number [id number]     Expiration Date 07/30/2025    POC Glucose, Blood    Specimen: Blood   Result Value Ref Range    Glucose 155 (A) 70 - 130 mg/dL    Lot Number [id number]     Expiration Date 06/30/2024      Has had thyroid u/s in interim - stable thyroid nodules - has had fna - x 2 with benign pathology done at [facility]- Dr Telles   doctor following the nodules told him he will repeat again in 1 year and then would recommend following clinically     Downloaded pump and sensor and reviewed 10 days of sensor and pump data  Higher evening sugars  He is using manual mode and extended bolus for lunch and dinner  Insulin action is set at 2 hrs  He has correction threshold set at 130 and we discussed reducing this to 120  Can also set delayed bolus back to 1 hr from 1 hour 30 min if this is not helpful  A1c reviewed  Results for orders placed or performed in visit on 12/29/23   POC Glycosylated Hemoglobin (Hb A1C)    Specimen: Blood   Result Value Ref Range    Hemoglobin A1C 7.2 (A) 4.5 - 5.7 %    Lot Number [id number]     Expiration Date 07/30/2025    POC Glucose, Blood    Specimen: Blood   Result Value Ref Range    Glucose 155 (A) 70 - 130 mg/dL    Lot Number [id number]     Expiration Date 06/30/2024          No Known Allergies    Current Outpatient Medications:     Ascorbic Acid (Vitamin C) 500 MG chewable tablet, Chew 2 (Two) Times a Day., Disp: , Rfl:     atorvastatin (LIPITOR) 10 MG tablet, Take  by mouth., Disp: , Rfl:     Combigan 0.2-0.5 % ophthalmic solution, Administer 1 drop into the  left eye Every 12 (Twelve) Hours., Disp: , Rfl:     Continuous Blood Gluc Sensor (Dexcom G6 Sensor), USE AS DIRECTED, Disp: 9 each, Rfl: 3    Continuous Blood Gluc Transmit (Dexcom G6 Transmitter) misc, USE AS DIRECTED, Disp: 1 each, Rfl: 3    Insulin Aspart (NovoLOG) 100 UNIT/ML injection, Omni Pod usage ...MDD of 75 units, Disp: 50 mL, Rfl: 3    Insulin Disposable Pump (Omnipod 5 G6 Pod, Gen 5,) misc, Inject 1 Units under the skin into the appropriate area as directed Every 72 (Seventy-Two) Hours., Disp: 30 each, Rfl: 1    Multiple Vitamins-Minerals (MULTIVITAMIN ADULT PO), Take  by mouth., Disp: , Rfl:     TADALAFIL PO, Take 20 mg by mouth. yanna, Disp: , Rfl:   Patient Active Problem List    Diagnosis     Primary osteoarthritis of both hips [M16.0]     Arthralgia of hip [M25.559]     Thyroid nodule [E04.1]     Erectile dysfunction [N52.9]     Diabetic retinopathy associated with type 1 diabetes mellitus [E10.319]     Benign prostatic hyperplasia with urinary obstruction [N40.1, N13.8]     Dyslipidemia [E78.5]     Type 1 diabetes mellitus with proliferative retinopathy [E10.3599]      Review of Systems   Constitutional:  Negative for activity change, appetite change and unexpected weight change.   HENT:  Negative for congestion and rhinorrhea.    Eyes:  Negative for visual disturbance.   Respiratory:  Negative for cough and shortness of breath.    Cardiovascular:  Negative for palpitations and leg swelling.   Gastrointestinal:  Negative for constipation, diarrhea and nausea.        Gastropathy   Genitourinary:  Negative for hematuria.   Musculoskeletal:  Negative for arthralgias, back pain, gait problem, joint swelling and myalgias.   Skin:  Negative for color change, rash and wound.   Allergic/Immunologic: Negative for environmental allergies, food allergies and immunocompromised state.   Neurological:  Negative for dizziness, weakness and light-headedness.   Psychiatric/Behavioral:  Negative for confusion,  "decreased concentration, dysphoric mood and sleep disturbance. The patient is not nervous/anxious.      Social History     Socioeconomic History    Marital status:    Tobacco Use    Smoking status: Never    Smokeless tobacco: Never   Vaping Use    Vaping Use: Never used   Substance and Sexual Activity    Alcohol use: No    Drug use: No    Sexual activity: Defer     Family History   Problem Relation Age of Onset    Arthritis Mother     Cancer Mother     Hypertension Mother     Heart disease Father     Diabetes Father     Cancer Sister     Hypertension Brother     Diabetes Brother      /64   Pulse 68   Ht 172.7 cm (68\")   Wt 68.4 kg (150 lb 12.8 oz)   SpO2 100%   BMI 22.93 kg/m²   Physical Exam  Vitals and nursing note reviewed.   Constitutional:       Appearance: Normal appearance. He is well-developed.   HENT:      Head: Normocephalic and atraumatic.   Eyes:      General: Lids are normal.      Extraocular Movements: Extraocular movements intact.      Conjunctiva/sclera: Conjunctivae normal.      Pupils: Pupils are equal, round, and reactive to light.   Neck:      Thyroid: No thyroid mass or thyromegaly.      Vascular: No carotid bruit.      Trachea: Trachea normal. No tracheal deviation.   Cardiovascular:      Rate and Rhythm: Normal rate and regular rhythm.      Heart sounds: Normal heart sounds. No murmur heard.     No friction rub. No gallop.   Pulmonary:      Effort: Pulmonary effort is normal. No respiratory distress.      Breath sounds: Normal breath sounds. No wheezing.   Musculoskeletal:         General: No deformity. Normal range of motion.      Cervical back: Normal range of motion and neck supple.   Feet:      Comments: Decreased DP pulses bilaterally  Intact PT pulses   Lymphadenopathy:      Cervical: No cervical adenopathy.   Skin:     General: Skin is warm and dry.      Findings: No erythema or rash.      Nails: There is no clubbing.   Neurological:      General: No focal deficit " present.      Mental Status: He is alert and oriented to person, place, and time.      Cranial Nerves: No cranial nerve deficit.      Deep Tendon Reflexes: Reflexes are normal and symmetric. Reflexes normal.   Psychiatric:         Speech: Speech normal.         Behavior: Behavior normal.         Thought Content: Thought content normal.         Judgment: Judgment normal.       Results for orders placed or performed in visit on 12/29/23   POC Glycosylated Hemoglobin (Hb A1C)    Specimen: Blood   Result Value Ref Range    Hemoglobin A1C 7.2 (A) 4.5 - 5.7 %    Lot Number [id number]     Expiration Date 07/30/2025    POC Glucose, Blood    Specimen: Blood   Result Value Ref Range    Glucose 155 (A) 70 - 130 mg/dL    Lot Number [id number]     Expiration Date 06/30/2024      Diagnoses and all orders for this visit:    1. Type 1 diabetes mellitus with proliferative retinopathy of both eyes, macular edema presence unspecified, unspecified proliferative retinopathy type (Primary)  Assessment & Plan:  Diagnosed at age 8 now maintained on OP5 pump / sensor closed loop   Complicated by retinopathy and gastropathy - uses manual mode for afternoon and evening meal bolus   10 days of sensor data reviewed and discussed reducing target to 120   Is utd with eye exam  Foot exam is normal today  Ur alb due NOV     Orders:  -     POC Glycosylated Hemoglobin (Hb A1C)  -     POC Glucose, Blood  -     Comprehensive Metabolic Panel; Future  -     Comprehensive Metabolic Panel    2. Thyroid nodule  Assessment & Plan:  Recent u/s reviewed   Remote biopsy benign with reduction in nodule size  Per u/s provider recommended 1 additional u/s and then can follow clinically   Last u/s nodule   Left inferior thyroid nodule, measured 1.34 x 0.43 x 1.41 cm (it was 1.39 x 0.60 x 1.21 cm 11/23/2021), solid cystic, isoechoic, heterogenous, ill defined border with few microcalcifications but no increased vascularity       3. Primary osteoarthritis of both  hips  Assessment & Plan:  Pain left with sitting to standing   Reduced ROM right - saw Dr Graham- said will need hip replacement in the future   Prescribed diclofenac         4. Dyslipidemia  Assessment & Plan:  Check flp   Taking lipitor 10 mg daily     Orders:  -     Lipid Panel; Future  -     TSH; Future  -     T4, Free; Future  -     Lipid Panel  -     TSH  -     T4, Free    Return in about 3 months (around 3/29/2024) for Recheck.    Aracely Allison MD  Signed Aracely Allison MD

## 2023-12-29 NOTE — ASSESSMENT & PLAN NOTE
Diagnosed at age 8 now maintained on OP5 pump / sensor closed loop   Complicated by retinopathy and gastropathy - uses manual mode for afternoon and evening meal bolus   10 days of sensor data reviewed and discussed reducing target to 120   Is utd with eye exam  Foot exam is normal today  Ur alb due NOV

## 2024-04-12 ENCOUNTER — HOSPITAL ENCOUNTER (OUTPATIENT)
Dept: GENERAL RADIOLOGY | Facility: HOSPITAL | Age: 59
Discharge: HOME | End: 2024-04-12
Admitting: INTERNAL MEDICINE
Payer: COMMERCIAL

## 2024-04-12 ENCOUNTER — TRANSCRIBE ORDERS (OUTPATIENT)
Dept: ADMINISTRATIVE | Facility: HOSPITAL | Age: 59
End: 2024-04-12
Payer: COMMERCIAL

## 2024-04-12 DIAGNOSIS — M54.50 LOW BACK PAIN, UNSPECIFIED BACK PAIN LATERALITY, UNSPECIFIED CHRONICITY, UNSPECIFIED WHETHER SCIATICA PRESENT: Primary | ICD-10-CM

## 2024-04-12 PROCEDURE — 72100 X-RAY EXAM L-S SPINE 2/3 VWS: CPT

## 2024-08-02 DIAGNOSIS — E10.319 TYPE 1 DIABETES MELLITUS WITH RETINOPATHY OF BOTH EYES, MACULAR EDEMA PRESENCE UNSPECIFIED, UNSPECIFIED RETINOPATHY SEVERITY: ICD-10-CM

## 2024-08-02 RX ORDER — PROCHLORPERAZINE 25 MG/1
SUPPOSITORY RECTAL
Qty: 9 EACH | Refills: 3 | Status: SHIPPED | OUTPATIENT
Start: 2024-08-02

## 2024-08-02 RX ORDER — PROCHLORPERAZINE 25 MG/1
SUPPOSITORY RECTAL
Qty: 1 EACH | Refills: 3 | Status: SHIPPED | OUTPATIENT
Start: 2024-08-02

## 2024-08-02 NOTE — TELEPHONE ENCOUNTER
Rx Refill Note  Requested Prescriptions     Pending Prescriptions Disp Refills    Continuous Glucose Sensor (Dexcom G6 Sensor) [Pharmacy Med Name: DEXCOM G6 MIS SENSOR] 9 each 3     Sig: USE AS DIRECTED    Continuous Glucose Transmitter (Dexcom G6 Transmitter) misc [Pharmacy Med Name: DEXCOM G6 MIS TRANSMIT] 1 each 3     Sig: USE AS DIRECTED FOR TYPE 2 DIABETES MELLITUS          Last office visit with prescribing clinician: 8/24/2022     Next office visit with prescribing clinician: Visit date not found         Selina Funez MA  08/02/24, 08:27 EDT

## 2024-08-12 ENCOUNTER — OFFICE VISIT (OUTPATIENT)
Dept: ENDOCRINOLOGY | Facility: CLINIC | Age: 59
End: 2024-08-12
Payer: COMMERCIAL

## 2024-08-12 VITALS
DIASTOLIC BLOOD PRESSURE: 60 MMHG | HEART RATE: 80 BPM | HEIGHT: 68 IN | BODY MASS INDEX: 21.95 KG/M2 | SYSTOLIC BLOOD PRESSURE: 120 MMHG | OXYGEN SATURATION: 97 % | WEIGHT: 144.8 LBS

## 2024-08-12 DIAGNOSIS — E10.3593 TYPE 1 DIABETES MELLITUS WITH PROLIFERATIVE RETINOPATHY OF BOTH EYES, MACULAR EDEMA PRESENCE UNSPECIFIED, UNSPECIFIED PROLIFERATIVE RETINOPATHY TYPE: ICD-10-CM

## 2024-08-12 DIAGNOSIS — E10.319 TYPE 1 DIABETES MELLITUS WITH RETINOPATHY OF BOTH EYES, MACULAR EDEMA PRESENCE UNSPECIFIED, UNSPECIFIED RETINOPATHY SEVERITY: Primary | ICD-10-CM

## 2024-08-12 DIAGNOSIS — E78.5 DYSLIPIDEMIA: ICD-10-CM

## 2024-08-12 LAB
EXPIRATION DATE: ABNORMAL
EXPIRATION DATE: ABNORMAL
GLUCOSE BLDC GLUCOMTR-MCNC: 150 MG/DL (ref 70–130)
HBA1C MFR BLD: 6.8 % (ref 4.5–5.7)
Lab: ABNORMAL
Lab: ABNORMAL

## 2024-08-12 PROCEDURE — 83036 HEMOGLOBIN GLYCOSYLATED A1C: CPT | Performed by: INTERNAL MEDICINE

## 2024-08-12 PROCEDURE — 80061 LIPID PANEL: CPT | Performed by: INTERNAL MEDICINE

## 2024-08-12 PROCEDURE — 82043 UR ALBUMIN QUANTITATIVE: CPT | Performed by: INTERNAL MEDICINE

## 2024-08-12 PROCEDURE — 95251 CONT GLUC MNTR ANALYSIS I&R: CPT | Performed by: INTERNAL MEDICINE

## 2024-08-12 PROCEDURE — 82570 ASSAY OF URINE CREATININE: CPT | Performed by: INTERNAL MEDICINE

## 2024-08-12 PROCEDURE — 80053 COMPREHEN METABOLIC PANEL: CPT | Performed by: INTERNAL MEDICINE

## 2024-08-12 PROCEDURE — 82947 ASSAY GLUCOSE BLOOD QUANT: CPT | Performed by: INTERNAL MEDICINE

## 2024-08-12 PROCEDURE — 99214 OFFICE O/P EST MOD 30 MIN: CPT | Performed by: INTERNAL MEDICINE

## 2024-08-12 PROCEDURE — 84443 ASSAY THYROID STIM HORMONE: CPT | Performed by: INTERNAL MEDICINE

## 2024-08-12 RX ORDER — PROCHLORPERAZINE 25 MG/1
1 SUPPOSITORY RECTAL
Qty: 9 EACH | Refills: 1 | Status: SHIPPED | OUTPATIENT
Start: 2024-08-12

## 2024-08-12 RX ORDER — INSULIN PMP CART,AUT,G6/7,CNTR
1 EACH SUBCUTANEOUS
Qty: 30 EACH | Refills: 1 | Status: SHIPPED | OUTPATIENT
Start: 2024-08-12

## 2024-08-12 NOTE — PROGRESS NOTES
Jonas Antonio 59 y.o.  CC: Diabetes (IDDM) and Hyperlipidemia      [place]: Diabetes (IDDM) and Hyperlipidemia    Interim lumbar back issues- L2-3 - getting epidural steroids  Blood sugar and 90 day average sugar reviewed  Results for orders placed or performed in visit on 08/12/24   POC Glucose, Blood    Specimen: Blood   Result Value Ref Range    Glucose 150 (A) 70 - 130 mg/dL    Lot Number [id number]     Expiration Date 1/20/2025    POC Glycosylated Hemoglobin (Hb A1C)    Specimen: Blood   Result Value Ref Range    Hemoglobin A1C 6.8 (A) 4.5 - 5.7 %    Lot Number [id number]     Expiration Date 4/26/2026      Is seeing [facility] Dr Grabiel Otto- got steroids and back is much better  Right radiculopathy - now improved   Downloaded pump and sensor and overall 10 days of blood sugars reviewed and are good   He has changed dual wave to 1 hour prior and is tending to give more insulin / lower insulin to carb count for meals  This has helped reduce pp high sugars  Bp is good  Is eating low fat diet and taking lipitor 10 mg daily     No Known Allergies    Current Outpatient Medications:   •  Ascorbic Acid (Vitamin C) 500 MG chewable tablet, Chew Daily. Chew 2 daily, Disp: , Rfl:   •  atorvastatin (LIPITOR) 10 MG tablet, Take  by mouth., Disp: , Rfl:   •  Combigan 0.2-0.5 % ophthalmic solution, Administer 1 drop into the left eye Every 12 (Twelve) Hours., Disp: , Rfl:   •  Continuous Glucose Sensor (Dexcom G6 Sensor), USE AS DIRECTED, Disp: 9 each, Rfl: 3  •  Continuous Glucose Transmitter (Dexcom G6 Transmitter) misc, USE AS DIRECTED FOR TYPE 2 DIABETES MELLITUS, Disp: 1 each, Rfl: 3  •  Insulin Aspart (NovoLOG) 100 UNIT/ML injection, Omni Pod usage ...MDD of 75 units, Disp: 50 mL, Rfl: 3  •  Insulin Disposable Pump (Omnipod 5 G6 Pod, Gen 5,) misc, Inject 1 Units under the skin into the appropriate area as directed Every 72 (Seventy-Two) Hours., Disp: 30 each, Rfl: 1  •  Multiple Vitamins-Minerals (MULTIVITAMIN ADULT PO),  Take  by mouth., Disp: , Rfl:   •  TADALAFIL PO, Take 20 mg by mouth. yanna, Disp: , Rfl:     Patient Active Problem List    Diagnosis    • Primary osteoarthritis of both hips [M16.0]    • Arthralgia of hip [M25.559]    • Thyroid nodule [E04.1]    • Erectile dysfunction [N52.9]    • Diabetic retinopathy associated with type 1 diabetes mellitus [E10.319]    • Benign prostatic hyperplasia with urinary obstruction [N40.1, N13.8]    • Dyslipidemia [E78.5]    • Type 1 diabetes mellitus with proliferative retinopathy [E10.3599]      Review of Systems   Constitutional:  Negative for activity change, appetite change and unexpected weight change.   HENT:  Negative for congestion and rhinorrhea.    Eyes:  Negative for visual disturbance.   Respiratory:  Negative for cough and shortness of breath.    Cardiovascular:  Negative for palpitations and leg swelling.   Gastrointestinal:  Negative for constipation, diarrhea and nausea.   Genitourinary:  Negative for hematuria.   Musculoskeletal:  Positive for arthralgias, back pain and gait problem. Negative for joint swelling and myalgias.   Skin:  Negative for color change, rash and wound.   Allergic/Immunologic: Negative for environmental allergies, food allergies and immunocompromised state.   Neurological:  Positive for numbness. Negative for dizziness, weakness and light-headedness.   Psychiatric/Behavioral:  Negative for confusion, decreased concentration, dysphoric mood and sleep disturbance. The patient is not nervous/anxious.    Social History     Socioeconomic History   • Marital status:    Tobacco Use   • Smoking status: Never   • Smokeless tobacco: Never   Vaping Use   • Vaping status: Never Used   Substance and Sexual Activity   • Alcohol use: No   • Drug use: No   • Sexual activity: Defer     Family History   Problem Relation Age of Onset   • Arthritis Mother    • Cancer Mother    • Hypertension Mother    • Heart disease Father    • Diabetes Father    • Cancer  "Sister    • Hypertension Brother    • Diabetes Brother      /60   Pulse 80   Ht 172.7 cm (67.99\")   Wt 65.7 kg (144 lb 12.8 oz)   SpO2 97%   BMI 22.02 kg/m²   Physical Exam  Vitals and nursing note reviewed.   Constitutional:       Appearance: Normal appearance. He is well-developed.   HENT:      Head: Normocephalic and atraumatic.   Eyes:      General: Lids are normal.      Extraocular Movements: Extraocular movements intact.      Conjunctiva/sclera: Conjunctivae normal.      Pupils: Pupils are equal, round, and reactive to light.   Neck:      Thyroid: No thyroid mass or thyromegaly.      Vascular: No carotid bruit.      Trachea: Trachea normal. No tracheal deviation.      Comments: No nodule palpable today   Cardiovascular:      Rate and Rhythm: Normal rate and regular rhythm.      Pulses: Normal pulses.      Heart sounds: Normal heart sounds. No murmur heard.     No friction rub. No gallop.   Pulmonary:      Effort: Pulmonary effort is normal. No respiratory distress.      Breath sounds: Normal breath sounds. No wheezing.   Musculoskeletal:         General: No deformity. Normal range of motion.      Cervical back: Normal range of motion and neck supple.   Lymphadenopathy:      Cervical: No cervical adenopathy.   Skin:     General: Skin is warm and dry.      Findings: No erythema or rash.      Nails: There is no clubbing.   Neurological:      Mental Status: He is alert and oriented to person, place, and time.      Cranial Nerves: No cranial nerve deficit.      Deep Tendon Reflexes: Reflexes are normal and symmetric. Reflexes normal.   Psychiatric:         Speech: Speech normal.         Behavior: Behavior normal.         Thought Content: Thought content normal.         Judgment: Judgment normal.   Results for orders placed or performed in visit on 08/12/24   POC Glucose, Blood    Specimen: Blood   Result Value Ref Range    Glucose 150 (A) 70 - 130 mg/dL    Lot Number [id number]     Expiration Date " 1/20/2025    POC Glycosylated Hemoglobin (Hb A1C)    Specimen: Blood   Result Value Ref Range    Hemoglobin A1C 6.8 (A) 4.5 - 5.7 %    Lot Number [id number]     Expiration Date 4/26/2026      Diagnoses and all orders for this visit:    1. 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, macular edema presence unspecified, unspecified retinopathy severity (Primary)  -     POC Glucose, Blood  -     POC Glycosylated Hemoglobin (Hb A1C)      Electronically signed by: Aracely Allison MD

## 2024-08-13 PROBLEM — M54.50 LOW BACK PAIN: Status: ACTIVE | Noted: 2024-04-12

## 2024-08-13 PROBLEM — M19.90 IDIOPATHIC OSTEOARTHRITIS: Status: ACTIVE | Noted: 2024-04-12

## 2024-08-13 PROBLEM — E78.5 HYPERLIPIDEMIA: Status: ACTIVE | Noted: 2024-04-12

## 2024-08-13 LAB
ALBUMIN SERPL-MCNC: 4.1 G/DL (ref 3.5–5.2)
ALBUMIN UR-MCNC: 1.3 MG/DL
ALBUMIN/GLOB SERPL: 1.7 G/DL
ALP SERPL-CCNC: 78 U/L (ref 39–117)
ALT SERPL W P-5'-P-CCNC: 20 U/L (ref 1–41)
ANION GAP SERPL CALCULATED.3IONS-SCNC: 8.6 MMOL/L (ref 5–15)
AST SERPL-CCNC: 23 U/L (ref 1–40)
BILIRUB SERPL-MCNC: 0.3 MG/DL (ref 0–1.2)
BUN SERPL-MCNC: 19 MG/DL (ref 8–23)
BUN/CREAT SERPL: 24.1 (ref 7–25)
CALCIUM SPEC-SCNC: 9.5 MG/DL (ref 8.6–10.5)
CHLORIDE SERPL-SCNC: 103 MMOL/L (ref 98–107)
CHOLEST SERPL-MCNC: 142 MG/DL (ref 0–200)
CO2 SERPL-SCNC: 28.4 MMOL/L (ref 22–29)
CREAT SERPL-MCNC: 0.79 MG/DL (ref 0.76–1.27)
CREAT UR-MCNC: 132.3 MG/DL
EGFRCR SERPLBLD CKD-EPI 2021: 101.7 ML/MIN/1.73
GLOBULIN UR ELPH-MCNC: 2.4 GM/DL
GLUCOSE SERPL-MCNC: 157 MG/DL (ref 65–99)
HDLC SERPL-MCNC: 58 MG/DL (ref 40–60)
LDLC SERPL CALC-MCNC: 62 MG/DL (ref 0–100)
LDLC/HDLC SERPL: 1 {RATIO}
MICROALBUMIN/CREAT UR: 9.8 MG/G (ref 0–29)
POTASSIUM SERPL-SCNC: 4.4 MMOL/L (ref 3.5–5.2)
PROT SERPL-MCNC: 6.5 G/DL (ref 6–8.5)
SODIUM SERPL-SCNC: 140 MMOL/L (ref 136–145)
TRIGL SERPL-MCNC: 129 MG/DL (ref 0–150)
TSH SERPL DL<=0.05 MIU/L-ACNC: 1.21 UIU/ML (ref 0.27–4.2)
VLDLC SERPL-MCNC: 22 MG/DL (ref 5–40)

## 2024-08-13 NOTE — ASSESSMENT & PLAN NOTE
Doing well overall- good control by A1c   Results for orders placed or performed in visit on 08/12/24   Comprehensive Metabolic Panel    Specimen: Arm, Left; Blood   Result Value Ref Range    Glucose 157 (H) 65 - 99 mg/dL    BUN 19 8 - 23 mg/dL    Creatinine 0.79 0.76 - 1.27 mg/dL    Sodium 140 136 - 145 mmol/L    Potassium 4.4 3.5 - 5.2 mmol/L    Chloride 103 98 - 107 mmol/L    CO2 28.4 22.0 - 29.0 mmol/L    Calcium 9.5 8.6 - 10.5 mg/dL    Total Protein 6.5 6.0 - 8.5 g/dL    Albumin 4.1 3.5 - 5.2 g/dL    ALT (SGPT) 20 1 - 41 U/L    AST (SGOT) 23 1 - 40 U/L    Alkaline Phosphatase 78 39 - 117 U/L    Total Bilirubin 0.3 0.0 - 1.2 mg/dL    Globulin 2.4 gm/dL    A/G Ratio 1.7 g/dL    BUN/Creatinine Ratio 24.1 7.0 - 25.0    Anion Gap 8.6 5.0 - 15.0 mmol/L    eGFR 101.7 >60.0 mL/min/1.73   Microalbumin / Creatinine Urine Ratio - Urine, Clean Catch    Specimen: Urine, Clean Catch   Result Value Ref Range    Microalbumin/Creatinine Ratio 9.8 0.0 - 29.0 mg/g    Creatinine, Urine 132.3 mg/dL    Microalbumin, Urine 1.3 mg/dL   Lipid Panel    Specimen: Arm, Left; Blood   Result Value Ref Range    Total Cholesterol 142 0 - 200 mg/dL    Triglycerides 129 0 - 150 mg/dL    HDL Cholesterol 58 40 - 60 mg/dL    LDL Cholesterol  62 0 - 100 mg/dL    VLDL Cholesterol 22 5 - 40 mg/dL    LDL/HDL Ratio 1.00    TSH    Specimen: Arm, Left; Blood   Result Value Ref Range    TSH 1.210 0.270 - 4.200 uIU/mL   POC Glucose, Blood    Specimen: Blood   Result Value Ref Range    Glucose 150 (A) 70 - 130 mg/dL    Lot Number [id number]     Expiration Date 1/20/2025    POC Glycosylated Hemoglobin (Hb A1C)    Specimen: Blood   Result Value Ref Range    Hemoglobin A1C 6.8 (A) 4.5 - 5.7 %    Lot Number [id number]     Expiration Date 4/26/2026    Downloaded pump and sensor and discussed higher pp lunch and dinner sugars- he is giving insulin as dual wave but is giving more up front with better pp sugars  Commended improvement in A1c- 14 days of sensor data  reviewed   Utd with eye exam  Check ur alb  No foot lesion   Having problems with numbness due to back (right leg)  F/u 3-4 months

## 2024-08-23 DIAGNOSIS — E10.319 TYPE 1 DIABETES MELLITUS WITH RETINOPATHY OF BOTH EYES, MACULAR EDEMA PRESENCE UNSPECIFIED, UNSPECIFIED RETINOPATHY SEVERITY: ICD-10-CM

## 2024-08-23 RX ORDER — INSULIN ASPART 100 [IU]/ML
INJECTION, SOLUTION INTRAVENOUS; SUBCUTANEOUS
Qty: 70 ML | Refills: 1 | Status: SHIPPED | OUTPATIENT
Start: 2024-08-23

## 2024-08-23 NOTE — TELEPHONE ENCOUNTER
Rx Refill Note  Requested Prescriptions     Pending Prescriptions Disp Refills    NovoLOG 100 UNIT/ML injection [Pharmacy Med Name: NOVOLOG VIA 100U/ML] 50 mL 3     Sig: USE MAXIMUM DAILY DOSE OF  75 UNITS. (OMNI POD USAGE)        Last office visit with prescribing clinician: 8/12/2024      Next office visit with prescribing clinician: 11/19/2024       Nelly Pruitt ([place])  08/23/24, 14:36 EDT

## 2024-10-18 ENCOUNTER — PRIOR AUTHORIZATION (OUTPATIENT)
Dept: ENDOCRINOLOGY | Facility: CLINIC | Age: 59
End: 2024-10-18
Payer: COMMERCIAL

## 2024-10-18 NOTE — TELEPHONE ENCOUNTER
MAUDE MILTON (Key: [id number])  PA Case ID #: [id number]  Rx #: [id number]  Need Help? Call us at [phone]  Outcome  Approved today by [facility] 2017  Your PA request has been approved. Additional information will be provided in the approval communication. (Message 1141)  Authorization Expiration Date: 10/18/2025  Drug  Omnipod 5 [id number] Pods Gen 5  ePA cloud logo  Form  [employer] PA Form (2017 [employer])  Original Claim Info  75    The authorization is valid from 09/18/2024 through 10/18/2025. A letter of explanation will also be mailed to the patient.    Pt and phar notified

## 2024-11-19 ENCOUNTER — OFFICE VISIT (OUTPATIENT)
Dept: ENDOCRINOLOGY | Facility: CLINIC | Age: 59
End: 2024-11-19
Payer: COMMERCIAL

## 2024-11-19 VITALS
WEIGHT: 150.2 LBS | DIASTOLIC BLOOD PRESSURE: 64 MMHG | OXYGEN SATURATION: 97 % | SYSTOLIC BLOOD PRESSURE: 124 MMHG | HEART RATE: 73 BPM | BODY MASS INDEX: 22.76 KG/M2 | HEIGHT: 68 IN

## 2024-11-19 DIAGNOSIS — E78.00 PURE HYPERCHOLESTEROLEMIA: ICD-10-CM

## 2024-11-19 DIAGNOSIS — E10.319 TYPE 1 DIABETES MELLITUS WITH RETINOPATHY OF BOTH EYES, MACULAR EDEMA PRESENCE UNSPECIFIED, UNSPECIFIED RETINOPATHY SEVERITY: Primary | ICD-10-CM

## 2024-11-19 LAB
EXPIRATION DATE: ABNORMAL
EXPIRATION DATE: ABNORMAL
GLUCOSE BLDC GLUCOMTR-MCNC: 208 MG/DL (ref 70–130)
HBA1C MFR BLD: 6.8 % (ref 4.5–5.7)
Lab: ABNORMAL
Lab: ABNORMAL

## 2024-11-19 PROCEDURE — 99214 OFFICE O/P EST MOD 30 MIN: CPT | Performed by: INTERNAL MEDICINE

## 2024-11-19 PROCEDURE — 83036 HEMOGLOBIN GLYCOSYLATED A1C: CPT | Performed by: INTERNAL MEDICINE

## 2024-11-19 PROCEDURE — 95251 CONT GLUC MNTR ANALYSIS I&R: CPT | Performed by: INTERNAL MEDICINE

## 2024-11-19 NOTE — PROGRESS NOTES
Jonas Antonio 59 y.o.  CC: follow up IDDM    [place]: follow up IDDM   Bp is good   Is eating low fat diet and taking lipitor 10 mg daily   Results for orders placed or performed in visit on 11/19/24   POC Glucose, Blood    Collection Time: 11/19/24  9:56 AM    Specimen: Blood   Result Value Ref Range    Glucose 208 (A) 70 - 130 mg/dL    Lot Number [id number]     Expiration Date 6/2/25    POC Glycosylated Hemoglobin (Hb A1C)    Collection Time: 11/19/24  9:57 AM    Specimen: Blood   Result Value Ref Range    Hemoglobin A1C 6.8 (A) 4.5 - 5.7 %    Lot Number [id number]     Expiration Date 8/1/26      Average sugar is good   Downloaded pump and reviewed 10 days of sensor data   TDD 32 u   Occ higher pp sugars  Occ low sugar  Is using extended bolus for meals   Is utd with eye exam  No foot callus or ulceration, no neuropathy symptoms  Ur alb neg    No Known Allergies    Current Outpatient Medications:     Ascorbic Acid (Vitamin C) 500 MG chewable tablet, Chew Daily. Chew 2 daily, Disp: , Rfl:     atorvastatin (LIPITOR) 10 MG tablet, Take  by mouth., Disp: , Rfl:     Combigan 0.2-0.5 % ophthalmic solution, Administer 1 drop into the left eye Every 12 (Twelve) Hours., Disp: , Rfl:     Continuous Glucose Sensor (Dexcom G6 Sensor), USE AS DIRECTED, Disp: 9 each, Rfl: 3    Continuous Glucose Transmitter (Dexcom G6 Transmitter) misc, Inject 1 Units under the skin into the appropriate area as directed Every 10 (Ten) Days., Disp: 9 each, Rfl: 1    Insulin Aspart (NovoLOG) 100 UNIT/ML injection, Use as directed in insulin pump mdd 75 units, Disp: 70 mL, Rfl: 1    Insulin Disposable Pump (Omnipod 5 G6 Pods, Gen 5,) misc, Inject 1 Units under the skin into the appropriate area as directed Every 72 (Seventy-Two) Hours., Disp: 30 each, Rfl: 1    Multiple Vitamins-Minerals (MULTIVITAMIN ADULT PO), Take  by mouth., Disp: , Rfl:     TADALAFIL PO, Take 20 mg by mouth. yanna, Disp: , Rfl:     Patient Active Problem List     Diagnosis     Hyperlipidemia [E78.5]     Idiopathic osteoarthritis [M19.90]     Low back pain [M54.50]     Primary osteoarthritis of both hips [M16.0]     Arthralgia of hip [M25.559]     Thyroid nodule [E04.1]     Erectile dysfunction [N52.9]     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 [E10.319]     Benign prostatic hyperplasia with urinary obstruction [N40.1, N13.8]     Dyslipidemia [E78.5]     Type 1 diabetes mellitus with proliferative retinopathy [E10.3599]      Review of Systems   Constitutional:  Negative for activity change, appetite change and unexpected weight change.   HENT:  Negative for congestion and rhinorrhea.    Eyes:  Negative for visual disturbance.   Respiratory:  Negative for cough and shortness of breath.    Cardiovascular:  Negative for palpitations and leg swelling.   Gastrointestinal:  Negative for constipation, diarrhea and nausea.   Genitourinary:  Negative for hematuria.   Musculoskeletal:  Negative for arthralgias, back pain, gait problem, joint swelling and myalgias.   Skin:  Negative for color change, rash and wound.   Allergic/Immunologic: Negative for environmental allergies, food allergies and immunocompromised state.   Neurological:  Negative for dizziness, weakness and light-headedness.   Psychiatric/Behavioral:  Negative for confusion, decreased concentration, dysphoric mood and sleep disturbance. The patient is not nervous/anxious.      Social History     Socioeconomic History    Marital status:    Tobacco Use    Smoking status: Never    Smokeless tobacco: Never   Vaping Use    Vaping status: Never Used   Substance and Sexual Activity    Alcohol use: No    Drug use: No    Sexual activity: Defer     Family History   Problem Relation Age of Onset    Arthritis Mother     Cancer Mother     Hypertension Mother     Heart disease Father     Diabetes Father     Cancer Sister     Hypertension Brother     Diabetes Brother      /64 (BP Location: Left arm, Patient  "Position: Sitting, Cuff Size: Adult)   Pulse 73   Ht 172.7 cm (67.99\")   Wt 68.1 kg (150 lb 3.2 oz)   SpO2 97%   BMI 22.84 kg/m²   Physical Exam  Vitals and nursing note reviewed.   Constitutional:       Appearance: Normal appearance. He is well-developed.   HENT:      Head: Normocephalic and atraumatic.      Nose: Nose normal.   Eyes:      General: Lids are normal.      Extraocular Movements: Extraocular movements intact.      Conjunctiva/sclera: Conjunctivae normal.      Pupils: Pupils are equal, round, and reactive to light.   Neck:      Thyroid: No thyroid mass or thyromegaly.      Vascular: No carotid bruit.      Trachea: Trachea normal. No tracheal deviation.   Cardiovascular:      Rate and Rhythm: Normal rate and regular rhythm.      Heart sounds: Normal heart sounds. No murmur heard.     No friction rub. No gallop.   Pulmonary:      Effort: Pulmonary effort is normal. No respiratory distress.      Breath sounds: Normal breath sounds. No wheezing.   Musculoskeletal:         General: No deformity. Normal range of motion.      Cervical back: Normal range of motion and neck supple.   Feet:      Comments: Diabetic foot exam:   Left: Filament test present   Pulses Dorsalis Pedis:  present   Reflexes 3+    Vibratory sensation normal   Proprioception normal   Sharp/dull discrimination normal       Right: Filament test present   Pulses Dorsalis Pedis:  present   Reflexes 3+    Vibratory sensation normal   Proprioception normal   Sharp/dull discrimination normal      Lymphadenopathy:      Cervical: No cervical adenopathy.   Skin:     General: Skin is warm and dry.      Findings: No erythema or rash.      Nails: There is no clubbing.   Neurological:      Mental Status: He is alert and oriented to person, place, and time.      Cranial Nerves: No cranial nerve deficit.      Deep Tendon Reflexes: Reflexes are normal and symmetric. Reflexes normal.   Psychiatric:         Speech: Speech normal.         Behavior: " Behavior normal.         Thought Content: Thought content normal.         Judgment: Judgment normal.       Results for orders placed or performed in visit on 11/19/24   POC Glucose, Blood    Collection Time: 11/19/24  9:56 AM    Specimen: Blood   Result Value Ref Range    Glucose 208 (A) 70 - 130 mg/dL    Lot Number [id number]     Expiration Date 6/2/25    POC Glycosylated Hemoglobin (Hb A1C)    Collection Time: 11/19/24  9:57 AM    Specimen: Blood   Result Value Ref Range    Hemoglobin A1C 6.8 (A) 4.5 - 5.7 %    Lot Number [id number]     Expiration Date 8/1/26      Diagnoses and all orders for this visit:    1. 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, macular edema presence unspecified, unspecified retinopathy severity (Primary)  Assessment & Plan:  BLood sugar and 90 day average sugar reviewed  Results for orders placed or performed in visit on 11/19/24   POC Glucose, Blood    Collection Time: 11/19/24  9:56 AM    Specimen: Blood   Result Value Ref Range    Glucose 208 (A) 70 - 130 mg/dL    Lot Number [id number]     Expiration Date 6/2/25    POC Glycosylated Hemoglobin (Hb A1C)    Collection Time: 11/19/24  9:57 AM    Specimen: Blood   Result Value Ref Range    Hemoglobin A1C 6.8 (A) 4.5 - 5.7 %    Lot Number [id number]     Expiration Date 8/1/26      Average sugar is good  Reviewed pump settings and they are stable  Ur alb neg  Is utd with eye exam  No neuropathy   F/u 3-4 months    Orders:  -     POC Glycosylated Hemoglobin (Hb A1C)  -     POC Glucose, Blood    2. Pure hypercholesterolemia  Assessment & Plan:  LDL 8/24 70  Continue monitoring and consider titration       Return in about 4 months (around 3/19/2025) for Recheck.    Electronically signed by: Aracely Allison MD

## 2024-11-19 NOTE — ASSESSMENT & PLAN NOTE
BLood sugar and 90 day average sugar reviewed  Results for orders placed or performed in visit on 11/19/24   POC Glucose, Blood    Collection Time: 11/19/24  9:56 AM    Specimen: Blood   Result Value Ref Range    Glucose 208 (A) 70 - 130 mg/dL    Lot Number [id number]     Expiration Date 6/2/25    POC Glycosylated Hemoglobin (Hb A1C)    Collection Time: 11/19/24  9:57 AM    Specimen: Blood   Result Value Ref Range    Hemoglobin A1C 6.8 (A) 4.5 - 5.7 %    Lot Number [id number]     Expiration Date 8/1/26      Average sugar is good  Reviewed pump settings and they are stable  Ur alb neg  Is utd with eye exam  No neuropathy   F/u 3-4 months

## 2025-03-20 ENCOUNTER — OFFICE VISIT (OUTPATIENT)
Dept: ENDOCRINOLOGY | Facility: CLINIC | Age: 60
End: 2025-03-20
Payer: COMMERCIAL

## 2025-03-20 VITALS
SYSTOLIC BLOOD PRESSURE: 124 MMHG | DIASTOLIC BLOOD PRESSURE: 74 MMHG | HEIGHT: 68 IN | BODY MASS INDEX: 22.73 KG/M2 | WEIGHT: 150 LBS | HEART RATE: 70 BPM

## 2025-03-20 DIAGNOSIS — E10.319 TYPE 1 DIABETES MELLITUS WITH RETINOPATHY OF BOTH EYES, MACULAR EDEMA PRESENCE UNSPECIFIED, UNSPECIFIED RETINOPATHY SEVERITY: Primary | ICD-10-CM

## 2025-03-20 DIAGNOSIS — E78.00 PURE HYPERCHOLESTEROLEMIA: ICD-10-CM

## 2025-03-20 LAB
ALBUMIN SERPL-MCNC: 4 G/DL (ref 3.5–5.2)
ALBUMIN/GLOB SERPL: 1.7 G/DL
ALP SERPL-CCNC: 91 U/L (ref 39–117)
ALT SERPL W P-5'-P-CCNC: 18 U/L (ref 1–41)
ANION GAP SERPL CALCULATED.3IONS-SCNC: 6.1 MMOL/L (ref 5–15)
AST SERPL-CCNC: 25 U/L (ref 1–40)
BILIRUB SERPL-MCNC: 0.4 MG/DL (ref 0–1.2)
BUN SERPL-MCNC: 22 MG/DL (ref 8–23)
BUN/CREAT SERPL: 22.9 (ref 7–25)
CALCIUM SPEC-SCNC: 9.5 MG/DL (ref 8.6–10.5)
CHLORIDE SERPL-SCNC: 103 MMOL/L (ref 98–107)
CHOLEST SERPL-MCNC: 132 MG/DL (ref 0–200)
CO2 SERPL-SCNC: 28.9 MMOL/L (ref 22–29)
CREAT SERPL-MCNC: 0.96 MG/DL (ref 0.76–1.27)
EGFRCR SERPLBLD CKD-EPI 2021: 90.5 ML/MIN/1.73
EXPIRATION DATE: ABNORMAL
EXPIRATION DATE: ABNORMAL
GLOBULIN UR ELPH-MCNC: 2.4 GM/DL
GLUCOSE BLDC GLUCOMTR-MCNC: 182 MG/DL (ref 70–130)
GLUCOSE SERPL-MCNC: 154 MG/DL (ref 65–99)
HBA1C MFR BLD: 7.3 % (ref 4.5–5.7)
HDLC SERPL-MCNC: 48 MG/DL (ref 40–60)
LDLC SERPL CALC-MCNC: 71 MG/DL (ref 0–100)
LDLC/HDLC SERPL: 1.48 {RATIO}
Lab: ABNORMAL
Lab: ABNORMAL
POTASSIUM SERPL-SCNC: 4.9 MMOL/L (ref 3.5–5.2)
PROT SERPL-MCNC: 6.4 G/DL (ref 6–8.5)
SODIUM SERPL-SCNC: 138 MMOL/L (ref 136–145)
T4 FREE SERPL-MCNC: 1.27 NG/DL (ref 0.92–1.68)
TRIGL SERPL-MCNC: 64 MG/DL (ref 0–150)
TSH SERPL DL<=0.05 MIU/L-ACNC: 1.1 UIU/ML (ref 0.27–4.2)
VLDLC SERPL-MCNC: 13 MG/DL (ref 5–40)

## 2025-03-20 PROCEDURE — 86376 MICROSOMAL ANTIBODY EACH: CPT | Performed by: INTERNAL MEDICINE

## 2025-03-20 PROCEDURE — 82570 ASSAY OF URINE CREATININE: CPT | Performed by: INTERNAL MEDICINE

## 2025-03-20 PROCEDURE — 99214 OFFICE O/P EST MOD 30 MIN: CPT | Performed by: INTERNAL MEDICINE

## 2025-03-20 PROCEDURE — 83036 HEMOGLOBIN GLYCOSYLATED A1C: CPT | Performed by: INTERNAL MEDICINE

## 2025-03-20 PROCEDURE — 80061 LIPID PANEL: CPT | Performed by: INTERNAL MEDICINE

## 2025-03-20 PROCEDURE — 80053 COMPREHEN METABOLIC PANEL: CPT | Performed by: INTERNAL MEDICINE

## 2025-03-20 PROCEDURE — 84439 ASSAY OF FREE THYROXINE: CPT | Performed by: INTERNAL MEDICINE

## 2025-03-20 PROCEDURE — 84443 ASSAY THYROID STIM HORMONE: CPT | Performed by: INTERNAL MEDICINE

## 2025-03-20 PROCEDURE — 95251 CONT GLUC MNTR ANALYSIS I&R: CPT | Performed by: INTERNAL MEDICINE

## 2025-03-20 PROCEDURE — 82043 UR ALBUMIN QUANTITATIVE: CPT | Performed by: INTERNAL MEDICINE

## 2025-03-20 PROCEDURE — 86800 THYROGLOBULIN ANTIBODY: CPT | Performed by: INTERNAL MEDICINE

## 2025-03-20 NOTE — PROGRESS NOTES
Jonas Antonio 59 y.o.  CC: follow up IDDM, hyperlipidemia    [place]: follow up IDDM, Hyperlipidemia    Blood sugar and 90 day average sugar reviewed  Results for orders placed or performed in visit on 03/20/25   POC Glucose, Blood    Collection Time: 03/20/25 10:31 AM    Specimen: Blood   Result Value Ref Range    Glucose 182 (A) 70 - 130 mg/dL    Lot Number [id number]     Expiration Date 08/30/2025    POC Glycosylated Hemoglobin (Hb A1C)    Collection Time: 03/20/25 10:31 AM    Specimen: Blood   Result Value Ref Range    Hemoglobin A1C 7.3 (A) 4.5 - 5.7 %    Lot Number [id number]     Expiration Date 11/06/2026      Is utd with preventive care  Ur alb neg   Bp is good  Is eating low fat diet, taking lipitor 10 mg daily    No Known Allergies    Current Outpatient Medications:     Ascorbic Acid (Vitamin C) 500 MG chewable tablet, Chew Daily. Chew 2 daily, Disp: , Rfl:     atorvastatin (LIPITOR) 10 MG tablet, Take  by mouth., Disp: , Rfl:     Combigan 0.2-0.5 % ophthalmic solution, Administer 1 drop into the left eye Every 12 (Twelve) Hours., Disp: , Rfl:     Continuous Glucose Sensor (Dexcom G6 Sensor), USE AS DIRECTED, Disp: 9 each, Rfl: 3    Continuous Glucose Transmitter (Dexcom G6 Transmitter) misc, Inject 1 Units under the skin into the appropriate area as directed Every 10 (Ten) Days., Disp: 9 each, Rfl: 1    Insulin Aspart (NovoLOG) 100 UNIT/ML injection, Use as directed in insulin pump mdd 75 units, Disp: 70 mL, Rfl: 1    Insulin Disposable Pump (Omnipod 5 G6 Pods, Gen 5,) misc, Inject 1 Units under the skin into the appropriate area as directed Every 72 (Seventy-Two) Hours., Disp: 30 each, Rfl: 1    Multiple Vitamins-Minerals (MULTIVITAMIN ADULT PO), Take  by mouth., Disp: , Rfl:     TADALAFIL PO, Take 20 mg by mouth. yanna, Disp: , Rfl:   Patient Active Problem List    Diagnosis     Hyperlipidemia [E78.5]     Idiopathic osteoarthritis [M19.90]     Low back pain [M54.50]     Primary osteoarthritis of  "both hips [M16.0]     Arthralgia of hip [M25.559]     Thyroid nodule [E04.1]     Erectile dysfunction [N52.9]     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 [E10.319]     Benign prostatic hyperplasia with urinary obstruction [N40.1, N13.8]     Dyslipidemia [E78.5]     Type 1 diabetes mellitus with proliferative retinopathy [E10.3599]      Review of Systems   Constitutional:  Negative for activity change, appetite change and unexpected weight change.   HENT:  Negative for congestion and rhinorrhea.    Eyes:  Negative for visual disturbance.   Respiratory:  Negative for cough and shortness of breath.    Cardiovascular:  Negative for palpitations and leg swelling.   Gastrointestinal:  Negative for constipation, diarrhea and nausea.   Genitourinary:  Negative for hematuria.   Musculoskeletal:  Negative for arthralgias, back pain, gait problem, joint swelling and myalgias.   Skin:  Negative for color change, rash and wound.   Allergic/Immunologic: Negative for environmental allergies, food allergies and immunocompromised state.   Neurological:  Negative for dizziness, weakness and light-headedness.   Psychiatric/Behavioral:  Negative for confusion, decreased concentration, dysphoric mood and sleep disturbance. The patient is not nervous/anxious.      Social History     Socioeconomic History    Marital status:    Tobacco Use    Smoking status: Never    Smokeless tobacco: Never   Vaping Use    Vaping status: Never Used   Substance and Sexual Activity    Alcohol use: No    Drug use: No    Sexual activity: Defer     Family History   Problem Relation Age of Onset    Arthritis Mother     Cancer Mother     Hypertension Mother     Heart disease Father     Diabetes Father     Cancer Sister     Hypertension Brother     Diabetes Brother      /74   Pulse 70   Ht 172.7 cm (67.99\")   Wt 68 kg (150 lb)   BMI 22.81 kg/m²   Physical Exam  Vitals and nursing note reviewed.   Constitutional:       Appearance: " Normal appearance. He is well-developed.   HENT:      Head: Normocephalic and atraumatic.   Eyes:      General: Lids are normal.      Conjunctiva/sclera: Conjunctivae normal.      Pupils: Pupils are equal, round, and reactive to light.   Neck:      Thyroid: No thyroid mass or thyromegaly.      Vascular: No carotid bruit.      Trachea: Trachea normal. No tracheal deviation.   Cardiovascular:      Rate and Rhythm: Normal rate and regular rhythm.      Heart sounds: Normal heart sounds. No murmur heard.     No friction rub. No gallop.   Pulmonary:      Effort: Pulmonary effort is normal. No respiratory distress.      Breath sounds: Normal breath sounds. No wheezing.   Musculoskeletal:         General: No deformity. Normal range of motion.      Cervical back: Normal range of motion and neck supple.   Lymphadenopathy:      Cervical: No cervical adenopathy.   Skin:     General: Skin is warm and dry.      Findings: No erythema or rash.      Nails: There is no clubbing.   Neurological:      Mental Status: He is alert and oriented to person, place, and time.      Cranial Nerves: No cranial nerve deficit.      Deep Tendon Reflexes: Reflexes are normal and symmetric. Reflexes normal.   Psychiatric:         Speech: Speech normal.         Behavior: Behavior normal.         Thought Content: Thought content normal.         Judgment: Judgment normal.       Results for orders placed or performed in visit on 03/20/25   POC Glucose, Blood    Collection Time: 03/20/25 10:31 AM    Specimen: Blood   Result Value Ref Range    Glucose 182 (A) 70 - 130 mg/dL    Lot Number [id number]     Expiration Date 08/30/2025    POC Glycosylated Hemoglobin (Hb A1C)    Collection Time: 03/20/25 10:31 AM    Specimen: Blood   Result Value Ref Range    Hemoglobin A1C 7.3 (A) 4.5 - 5.7 %    Lot Number [id number]     Expiration Date 11/06/2026      Diagnoses and all orders for this visit:    1. 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, macular  edema presence unspecified, unspecified retinopathy severity (Primary)  Assessment & Plan:  Blood sugar and 90 day average sugar reviewed  Results for orders placed or performed in visit on 03/20/25   POC Glucose, Blood    Collection Time: 03/20/25 10:31 AM    Specimen: Blood   Result Value Ref Range    Glucose 182 (A) 70 - 130 mg/dL    Lot Number [id number]     Expiration Date 08/30/2025    POC Glycosylated Hemoglobin (Hb A1C)    Collection Time: 03/20/25 10:31 AM    Specimen: Blood   Result Value Ref Range    Hemoglobin A1C 7.3 (A) 4.5 - 5.7 %    Lot Number [id number]     Expiration Date 11/06/2026      Average sugar is 160   He is frustrated because sugars hover around 120   Reviewed pump and pump settings- discussed target and threshold for correction are both set at 120 - recommended reducing this to 110   5 d of pump data reviewed  Is utd with eye exam  No foot lesion   Ur alb neg  F/u 3-4 months     Orders:  -     POC Glucose, Blood  -     POC Glycosylated Hemoglobin (Hb A1C)  -     Comprehensive Metabolic Panel; Future  -     Microalbumin / Creatinine Urine Ratio - Urine, Clean Catch; Future  -     Comprehensive Metabolic Panel  -     Microalbumin / Creatinine Urine Ratio - Urine, Clean Catch    2. Pure hypercholesterolemia  Assessment & Plan:  Taking lipitor 10 mg daily   Check flp     Orders:  -     T4, Free; Future  -     TSH; Future  -     Thyroid Antibodies; Future  -     Lipid Panel; Future  -     T4, Free  -     TSH  -     Thyroid Antibodies  -     Lipid Panel    Return in about 4 months (around 7/20/2025) for Recheck.    Electronically signed by: Aracely Allison MD

## 2025-03-20 NOTE — ASSESSMENT & PLAN NOTE
Blood sugar and 90 day average sugar reviewed  Results for orders placed or performed in visit on 03/20/25   POC Glucose, Blood    Collection Time: 03/20/25 10:31 AM    Specimen: Blood   Result Value Ref Range    Glucose 182 (A) 70 - 130 mg/dL    Lot Number [id number]     Expiration Date 08/30/2025    POC Glycosylated Hemoglobin (Hb A1C)    Collection Time: 03/20/25 10:31 AM    Specimen: Blood   Result Value Ref Range    Hemoglobin A1C 7.3 (A) 4.5 - 5.7 %    Lot Number [id number]     Expiration Date 11/06/2026      Average sugar is 160   He is frustrated because sugars hover around 120   Reviewed pump and pump settings- discussed target and threshold for correction are both set at 120 - recommended reducing this to 110   5 d of pump data reviewed  Is utd with eye exam  No foot lesion   Ur alb neg  F/u 3-4 months

## 2025-03-21 LAB
ALBUMIN UR-MCNC: <1.2 MG/DL
CREAT UR-MCNC: 146.7 MG/DL
MICROALBUMIN/CREAT UR: NORMAL MG/G{CREAT}

## 2025-03-24 ENCOUNTER — RESULTS FOLLOW-UP (OUTPATIENT)
Dept: ENDOCRINOLOGY | Facility: CLINIC | Age: 60
End: 2025-03-24
Payer: COMMERCIAL

## 2025-03-24 NOTE — LETTER
Jonas Antonio  [address]  [facility] [address]    March 27, 2025     Dear Mr. Antonio:    Below are the results from your recent visit:    Resulted Orders   POC Glucose, Blood   Result Value Ref Range    Glucose 182 (A) 70 - 130 mg/dL    Lot Number [id number]     Expiration Date 08/30/2025    POC Glycosylated Hemoglobin (Hb A1C)   Result Value Ref Range    Hemoglobin A1C 7.3 (A) 4.5 - 5.7 %    Lot Number [id number]     Expiration Date 11/06/2026    T4, Free   Result Value Ref Range    Free T4 1.27 0.92 - 1.68 ng/dL   TSH   Result Value Ref Range    TSH 1.100 0.270 - 4.200 uIU/mL   Thyroid Antibodies   Result Value Ref Range    Thyroid Peroxidase Antibody 10 0 - 34 IU/mL    Thyroglobulin Ab <1.0 0.0 - 0.9 IU/mL      Comment:      Thyroglobulin Antibody measured by [employer] Methodology  It should be noted that the presence of thyroglobulin antibodies  may not be pathogenic nor diagnostic, especially at very low  levels. The assay  has found that four percent of  individuals without evidence of thyroid disease or autoimmunity  will have positive TgAb levels up to 4 IU/mL.   Comprehensive Metabolic Panel   Result Value Ref Range    Glucose 154 (H) 65 - 99 mg/dL    BUN 22 8 - 23 mg/dL    Creatinine 0.96 0.76 - 1.27 mg/dL    Sodium 138 136 - 145 mmol/L    Potassium 4.9 3.5 - 5.2 mmol/L    Chloride 103 98 - 107 mmol/L    CO2 28.9 22.0 - 29.0 mmol/L    Calcium 9.5 8.6 - 10.5 mg/dL    Total Protein 6.4 6.0 - 8.5 g/dL    Albumin 4.0 3.5 - 5.2 g/dL    ALT (SGPT) 18 1 - 41 U/L    AST (SGOT) 25 1 - 40 U/L    Alkaline Phosphatase 91 39 - 117 U/L    Total Bilirubin 0.4 0.0 - 1.2 mg/dL    Globulin 2.4 gm/dL    A/G Ratio 1.7 g/dL    BUN/Creatinine Ratio 22.9 7.0 - 25.0    Anion Gap 6.1 5.0 - 15.0 mmol/L    eGFR 90.5 >60.0 mL/min/1.73   Lipid Panel   Result Value Ref Range    Total Cholesterol 132 0 - 200 mg/dL    Triglycerides 64 0 - 150 mg/dL    HDL Cholesterol 48 40 - 60 mg/dL    LDL Cholesterol  71  0 - 100 mg/dL    VLDL Cholesterol 13 5 - 40 mg/dL    LDL/HDL Ratio 1.48    Microalbumin / Creatinine Urine Ratio - Urine, Clean Catch   Result Value Ref Range    Microalbumin/Creatinine Ratio        Comment:      Unable to calculate    Creatinine, Urine 146.7 mg/dL    Microalbumin, Urine <1.2 mg/dL       ADDENDUM:  thyroid antibodies are normal     If you have any questions or concerns, please don't hesitate to call.         Sincerely,        Aracely Allison MD

## 2025-03-24 NOTE — LETTER
Jonas Antonio  [address]  [facility] [address]    March 24, 2025     Dear Mr. Antonio:    Below are the results from your recent visit:    Resulted Orders   POC Glucose, Blood   Result Value Ref Range    Glucose 182 (A) 70 - 130 mg/dL    Lot Number [id number]     Expiration Date 08/30/2025    POC Glycosylated Hemoglobin (Hb A1C)   Result Value Ref Range    Hemoglobin A1C 7.3 (A) 4.5 - 5.7 %    Lot Number [id number]     Expiration Date 11/06/2026    T4, Free   Result Value Ref Range    Free T4 1.27 0.92 - 1.68 ng/dL   TSH   Result Value Ref Range    TSH 1.100 0.270 - 4.200 uIU/mL   Comprehensive Metabolic Panel   Result Value Ref Range    Glucose 154 (H) 65 - 99 mg/dL    BUN 22 8 - 23 mg/dL    Creatinine 0.96 0.76 - 1.27 mg/dL    Sodium 138 136 - 145 mmol/L    Potassium 4.9 3.5 - 5.2 mmol/L    Chloride 103 98 - 107 mmol/L    CO2 28.9 22.0 - 29.0 mmol/L    Calcium 9.5 8.6 - 10.5 mg/dL    Total Protein 6.4 6.0 - 8.5 g/dL    Albumin 4.0 3.5 - 5.2 g/dL    ALT (SGPT) 18 1 - 41 U/L    AST (SGOT) 25 1 - 40 U/L    Alkaline Phosphatase 91 39 - 117 U/L    Total Bilirubin 0.4 0.0 - 1.2 mg/dL    Globulin 2.4 gm/dL    A/G Ratio 1.7 g/dL    BUN/Creatinine Ratio 22.9 7.0 - 25.0    Anion Gap 6.1 5.0 - 15.0 mmol/L    eGFR 90.5 >60.0 mL/min/1.73   Lipid Panel   Result Value Ref Range    Total Cholesterol 132 0 - 200 mg/dL    Triglycerides 64 0 - 150 mg/dL    HDL Cholesterol 48 40 - 60 mg/dL    LDL Cholesterol  71 0 - 100 mg/dL    VLDL Cholesterol 13 5 - 40 mg/dL    LDL/HDL Ratio 1.48    Microalbumin / Creatinine Urine Ratio - Urine, Clean Catch   Result Value Ref Range    Microalbumin/Creatinine Ratio        Comment:      Unable to calculate    Creatinine, Urine 146.7 mg/dL    Microalbumin, Urine <1.2 mg/dL       Excellent results- no changes recommended.      If you have any questions or concerns, please don't hesitate to call.         Sincerely,        Aracely Allison MD

## 2025-03-25 LAB
THYROGLOB AB SERPL-ACNC: <1 IU/ML (ref 0–0.9)
THYROPEROXIDASE AB SERPL-ACNC: 10 IU/ML (ref 0–34)

## 2025-06-04 DIAGNOSIS — E10.319 TYPE 1 DIABETES MELLITUS WITH RETINOPATHY OF BOTH EYES, MACULAR EDEMA PRESENCE UNSPECIFIED, UNSPECIFIED RETINOPATHY SEVERITY: ICD-10-CM

## 2025-06-05 RX ORDER — INSULIN PMP CART,AUT,G6/7,CNTR
EACH SUBCUTANEOUS
Qty: 30 EACH | Refills: 1 | Status: SHIPPED | OUTPATIENT
Start: 2025-06-05

## 2025-06-05 RX ORDER — INSULIN ASPART 100 [IU]/ML
INJECTION, SOLUTION INTRAVENOUS; SUBCUTANEOUS
Qty: 70 ML | Refills: 0 | Status: SHIPPED | OUTPATIENT
Start: 2025-06-05

## 2025-06-05 NOTE — TELEPHONE ENCOUNTER
Rx Refill Note  Requested Prescriptions     Pending Prescriptions Disp Refills    Insulin Disposable Pump (Omnipod 5 [id number] Pods Gen 5) misc [Pharmacy Med Name: OMNIPOD 5 DX MIS POD G7G6] 30 each 1     Sig: USE 1 POD IN APPROPRIATE   AREA EVERY 72 HOURS AS     DIRECTED      Last office visit with prescribing clinician: 3/20/2025   Last telemedicine visit with prescribing clinician: Visit date not found   Next office visit with prescribing clinician: 7/22/2025                         Would you like a call back once the refill request has been completed: [] Yes [] No    If the office needs to give you a call back, can they leave a voicemail: [] Yes [] No    Kaylin Rodgers MA  06/05/25, 09:28 EDT

## 2025-06-05 NOTE — TELEPHONE ENCOUNTER
Rx Refill Note  Requested Prescriptions     Pending Prescriptions Disp Refills    Insulin Aspart (NovoLOG) 100 UNIT/ML injection [Pharmacy Med Name: NOVOLOG VIA 100U/ML] 70 mL 0     Sig: USE AS DIRECTED VIA INSULINPUMP. MAXIMUM DAILY DOSE OF75 UNITS.      Last office visit with prescribing clinician: Visit date not found   Last telemedicine visit with prescribing clinician: Visit date not found   Next office visit with prescribing clinician: Visit date not found                         Would you like a call back once the refill request has been completed: [] Yes [] No    If the office needs to give you a call back, can they leave a voicemail: [] Yes [] No    Amelie Moseley MA  06/05/25, 08:14 EDT

## 2025-06-27 ENCOUNTER — TELEPHONE (OUTPATIENT)
Dept: CARDIOLOGY | Facility: CLINIC | Age: 60
End: 2025-06-27

## 2025-06-27 NOTE — TELEPHONE ENCOUNTER
"  Caller: Jonas Antonio \"Oneil\"    Relationship: Self    Best call back number: [phone]     What is the best time to reach you: ANY    Who are you requesting to speak with (clinical staff, provider,  specific staff member): ANY        What was the call regarding: PATIENT CALLED AND STATES HE MISSED A CALL FROM THE OFFICE ON 6-27-25. PLEASE CALL PATIENT BACK AT YOUR EARLIEST CONVENIENCE.    Is it okay if the provider responds through [facility]: PLEASE CALL    "

## 2025-07-01 NOTE — PROGRESS NOTES
Subjective:     Encounter Date:07/02/2025    Primary Care Physician: Crispin Barone MD      Patient ID: Jonas Antonio is a 60 y.o. male.    Chief Complaint:Consult (Excessive PVC's)    PROBLEM LIST:  PVC  5/2025 72-hour Holter predominantly sinus rhythm.  PVC burden 21.62%.  75,937 total PVCs.  Bigeminy 12.9% of the time.  DM 1  Dyslipidemia  Thyroid nodule  GERD  Arthritis  Surgeries:  Vitrectomy  Tonsillectomy  Thumb surgery  Cataract extraction      No Known Allergies      Current Outpatient Medications:     Ascorbic Acid (Vitamin C) 500 MG chewable tablet, Chew Daily. Chew 2 daily, Disp: , Rfl:     atorvastatin (LIPITOR) 10 MG tablet, Take  by mouth., Disp: , Rfl:     Combigan 0.2-0.5 % ophthalmic solution, Administer 1 drop into the left eye Every 12 (Twelve) Hours., Disp: , Rfl:     Continuous Glucose Sensor (Dexcom G6 Sensor), USE AS DIRECTED, Disp: 9 each, Rfl: 3    Continuous Glucose Transmitter (Dexcom G6 Transmitter) misc, Inject 1 Units under the skin into the appropriate area as directed Every 10 (Ten) Days., Disp: 9 each, Rfl: 1    Insulin Disposable Pump (Omnipod 5 [id number] Pods Gen 5) misc, USE 1 POD IN APPROPRIATE   AREA EVERY 72 HOURS AS     DIRECTED, Disp: 30 each, Rfl: 1    Multiple Vitamins-Minerals (MULTIVITAMIN ADULT PO), Take  by mouth., Disp: , Rfl:     NovoLOG 100 UNIT/ML injection, USE AS DIRECTED VIA INSULINPUMP. MAXIMUM DAILY DOSE OF75 UNITS., Disp: 70 mL, Rfl: 0    TADALAFIL PO, Take 20 mg by mouth. yanna, Disp: , Rfl:         History of Present Illness    Patient is a 60-year-old  male who we are seeing today for further evaluation of significant PVC burden.  Patient has no previous history of any cardiac issues.  Notes that he was experiencing palpitations roughly 7 years ago.  He underwent stress echocardiogram at that time which he reports was normal.  Patient notes overall he is felt relatively fine.  He presented to his primary care physician for persistent  cough.  At that time his pulse was noted to be 39.  Eventually it was rechecked and his heart rate was back up to 60.  Holter monitor was ordered with results as noted above.  Given his significant PVC burden he was referred here for further evaluation.  Patient overall denies any significant symptoms related to his PVCs.  Notes that overall he is active without angina.    The following portions of the patient's history were reviewed and updated as appropriate: allergies, current medications, past family history, past medical history, past social history, past surgical history and problem list.    Family History   Problem Relation Age of Onset    Arthritis Mother     Cancer Mother         Lung cancer    Hypertension Mother     Heart disease Father     Diabetes Father     Heart attack Father     Cancer Father         Throat cancer    Cancer Sister         Lung cancer    Hypertension Brother     Diabetes Brother     Hypertension Maternal Grandfather     Cancer Maternal Grandmother         Breast cancer    Diabetes Paternal Grandmother        Social History     Tobacco Use    Smoking status: Never    Smokeless tobacco: Never   Vaping Use    Vaping status: Never Used   Substance Use Topics    Alcohol use: No    Drug use: No         Review of Systems   Constitutional: Negative for fever and malaise/fatigue.   HENT:  Negative for nosebleeds.    Eyes:  Negative for redness and visual disturbance.   Cardiovascular:  Positive for palpitations (rare). Negative for orthopnea and paroxysmal nocturnal dyspnea.   Respiratory:  Negative for cough, snoring, sputum production and wheezing.    Hematologic/Lymphatic: Negative for bleeding problem.   Skin:  Negative for flushing, itching and rash.   Musculoskeletal:  Negative for falls, joint pain and muscle cramps.   Gastrointestinal:  Negative for abdominal pain, diarrhea, heartburn, nausea and vomiting.   Genitourinary:  Negative for hematuria.   Neurological:  Negative for  "excessive daytime sleepiness, dizziness, headaches, tremors and weakness.   Psychiatric/Behavioral:  Negative for substance abuse. The patient is not nervous/anxious.           Objective:   /72   Pulse 86   Ht 172.7 cm (68\")   Wt 68.8 kg (151 lb 9.6 oz)   SpO2 100%   BMI 23.05 kg/m²         Vitals reviewed.   Constitutional:       Appearance: Healthy appearance. Well-developed and not in distress.   Eyes:      Conjunctiva/sclera: Conjunctivae normal.      Pupils: Pupils are equal, round, and reactive to light.   HENT:      Head: Normocephalic and atraumatic.    Mouth/Throat:      Pharynx: Oropharynx is clear.   Neck:      Thyroid: Thyroid normal. No thyromegaly.      Vascular: Normal carotid pulses. No carotid bruit or JVD. JVD normal.      Lymphadenopathy: No cervical adenopathy.   Pulmonary:      Effort: No respiratory distress.      Breath sounds: No wheezing. No rales.   Chest:      Chest wall: Not tender to palpatation.   Cardiovascular:      Normal rate. Regular rhythm.      No gallop.    Pulses:     Carotid: 2+ bilaterally.     Dorsalis pedis: 2+ bilaterally.     Posterior tibial: 2+ bilaterally.  Edema:     Peripheral edema absent.   Abdominal:      General: There is no distension or abdominal bruit.      Palpations: There is no abdominal mass.      Tenderness: There is no abdominal tenderness. There is no rebound.   Musculoskeletal:         General: No tenderness or deformity.      Extremities: No clubbing present.Skin:     General: Skin is warm and dry. There is no cyanosis.      Findings: No rash.   Neurological:      Mental Status: Alert, oriented to person, place, and time and oriented to person, place and time.           ECG 12 Lead    Date/Time: 7/2/2025 2:15 PM  Performed by: aNhid Chung MD    Authorized by: Nahid Chung MD  Comparison: compared with previous ECG from 5/1/2025  Comparison to previous ECG: Frequent PVCs now noted.  Rhythm: sinus rhythm  Ectopy: infrequent " PVCs                Assessment:   Assessment & Plan      Diagnoses and all orders for this visit:    1. PVC (premature ventricular contraction) (Primary)  -     ECG 12 Lead      1.  PVCs, unifocal, but 21% burden.  Asymptomatic.  2.  Type 1 diabetes, well-controlled A1c approximately 7  3.  Hypertension, blood pressure elevated today, some whitecoat component per patient  4.  Dyslipidemia on statin     Recommendations:  1.  Check echocardiogram  2.  Check exercise myocardial perfusion study to rule out ischemia.  3.  After the above, if no structurally abnormality of the heart, would consider low-dose beta-blocker giving high PVC burden.  4.  If there is evidence of LV dysfunction from his high PVC burden, he would be an excellent candidate for ablation given his unifocal nature of all of his PVCs.    Mary GANN scribed portions of this dictation for Dr. Nahid Chung.   I have seen and examined the patient, I have reviewed the note, discussed the case with the advance practice clinician, made necessary changes and I agree with the final note.    Nahid Chung MD  07/02/25  15:17 EDT              Dictated utilizing Dragon dictation

## 2025-07-02 ENCOUNTER — OFFICE VISIT (OUTPATIENT)
Dept: CARDIOLOGY | Facility: CLINIC | Age: 60
End: 2025-07-02
Payer: COMMERCIAL

## 2025-07-02 VITALS
OXYGEN SATURATION: 100 % | SYSTOLIC BLOOD PRESSURE: 153 MMHG | BODY MASS INDEX: 22.97 KG/M2 | HEART RATE: 86 BPM | DIASTOLIC BLOOD PRESSURE: 72 MMHG | WEIGHT: 151.6 LBS | HEIGHT: 68 IN

## 2025-07-02 DIAGNOSIS — I49.3 PVC (PREMATURE VENTRICULAR CONTRACTION): Primary | ICD-10-CM

## 2025-07-22 ENCOUNTER — OFFICE VISIT (OUTPATIENT)
Dept: ENDOCRINOLOGY | Facility: CLINIC | Age: 60
End: 2025-07-22
Payer: COMMERCIAL

## 2025-07-22 VITALS
BODY MASS INDEX: 22.88 KG/M2 | HEART RATE: 68 BPM | SYSTOLIC BLOOD PRESSURE: 128 MMHG | DIASTOLIC BLOOD PRESSURE: 64 MMHG | WEIGHT: 151 LBS | HEIGHT: 68 IN

## 2025-07-22 DIAGNOSIS — I49.3 PVC (PREMATURE VENTRICULAR CONTRACTION): ICD-10-CM

## 2025-07-22 DIAGNOSIS — E78.00 PURE HYPERCHOLESTEROLEMIA: ICD-10-CM

## 2025-07-22 DIAGNOSIS — E10.319 TYPE 1 DIABETES MELLITUS WITH RETINOPATHY OF BOTH EYES, MACULAR EDEMA PRESENCE UNSPECIFIED, UNSPECIFIED RETINOPATHY SEVERITY: Primary | ICD-10-CM

## 2025-07-22 LAB
EXPIRATION DATE: ABNORMAL
EXPIRATION DATE: ABNORMAL
GLUCOSE BLDC GLUCOMTR-MCNC: 147 MG/DL (ref 70–130)
HBA1C MFR BLD: 7.1 % (ref 4.5–5.7)
Lab: ABNORMAL
Lab: ABNORMAL

## 2025-07-22 PROCEDURE — 99214 OFFICE O/P EST MOD 30 MIN: CPT | Performed by: INTERNAL MEDICINE

## 2025-07-22 PROCEDURE — 83036 HEMOGLOBIN GLYCOSYLATED A1C: CPT | Performed by: INTERNAL MEDICINE

## 2025-07-22 PROCEDURE — 95251 CONT GLUC MNTR ANALYSIS I&R: CPT | Performed by: INTERNAL MEDICINE

## 2025-07-22 RX ORDER — TADALAFIL 5 MG/1
5 TABLET ORAL DAILY
COMMUNITY
Start: 2025-03-01

## 2025-07-22 NOTE — ASSESSMENT & PLAN NOTE
Blood sugar and 90 day average sugar reviewed  Results for orders placed or performed in visit on 07/22/25   POC Glucose, Blood    Collection Time: 07/22/25  8:53 AM    Specimen: Blood   Result Value Ref Range    Glucose 147 (A) 70 - 130 mg/dL    Lot Number [id number]     Expiration Date 08/30/2025    POC Glycosylated Hemoglobin (Hb A1C)    Collection Time: 07/22/25  8:54 AM    Specimen: Blood   Result Value Ref Range    Hemoglobin A1C 7.1 (A) 4.5 - 5.7 %    Lot Number [id number]     Expiration Date 11/06/2026      Adjust correction to 60 to reduce afternoon low sugar  Is utd with eye exam  No foot lesion   Considering G7- sensor sampled  F/u 3-4 months

## 2025-07-22 NOTE — PROGRESS NOTES
Jonas Antonio 60 y.o.  CC: follow up IDDM, Hyperlipidemia    [place]: follow up IDDM, Hyperlipidemia    Blood sugar and 90 day average sugar reviewed  Results for orders placed or performed in visit on 07/22/25   POC Glucose, Blood    Collection Time: 07/22/25  8:53 AM    Specimen: Blood   Result Value Ref Range    Glucose 147 (A) 70 - 130 mg/dL    Lot Number [id number]     Expiration Date 08/30/2025    POC Glycosylated Hemoglobin (Hb A1C)    Collection Time: 07/22/25  8:54 AM    Specimen: Blood   Result Value Ref Range    Hemoglobin A1C 7.1 (A) 4.5 - 5.7 %    Lot Number [id number]     Expiration Date 11/06/2026      Average sugar 150  4 days of sensor data reviewed- afternoon low - discussed raising correction to 60   Recent URI - feet swelling - seen by pcp HR 39 with appt Cardio Dr Chung   Planned stress test and possible EP if medication not effective     No Known Allergies    Current Outpatient Medications:     Ascorbic Acid (Vitamin C) 500 MG chewable tablet, Chew Daily. Chew 2 daily, Disp: , Rfl:     atorvastatin (LIPITOR) 10 MG tablet, Take  by mouth., Disp: , Rfl:     Combigan 0.2-0.5 % ophthalmic solution, Administer 1 drop into the left eye Every 12 (Twelve) Hours., Disp: , Rfl:     Continuous Glucose Sensor (Dexcom G6 Sensor), USE AS DIRECTED, Disp: 9 each, Rfl: 3    Continuous Glucose Transmitter (Dexcom G6 Transmitter) misc, Inject 1 Units under the skin into the appropriate area as directed Every 10 (Ten) Days., Disp: 9 each, Rfl: 1    Insulin Disposable Pump (Omnipod 5 [id number] Pods Gen 5) misc, USE 1 POD IN APPROPRIATE   AREA EVERY 72 HOURS AS     DIRECTED, Disp: 30 each, Rfl: 1    Multiple Vitamins-Minerals (MULTIVITAMIN ADULT PO), Take  by mouth., Disp: , Rfl:     NovoLOG 100 UNIT/ML injection, USE AS DIRECTED VIA INSULINPUMP. MAXIMUM DAILY DOSE OF75 UNITS., Disp: 70 mL, Rfl: 0    tadalafil (CIALIS) 5 MG tablet, Take 1 tablet by mouth Daily., Disp: , Rfl:     TADALAFIL PO, Take 20 mg by mouth.  yanna (Patient not taking: Reported on 7/22/2025), Disp: , Rfl:     Patient Active Problem List    Diagnosis     PVC (premature ventricular contraction) [I49.3]     Hyperlipidemia [E78.5]     Idiopathic osteoarthritis [M19.90]     Low back pain [M54.50]     Primary osteoarthritis of both hips [M16.0]     Arthralgia of hip [M25.559]     Thyroid nodule [E04.1]     Erectile dysfunction [N52.9]     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 [E10.319]     Benign prostatic hyperplasia with urinary obstruction [N40.1, N13.8]     Dyslipidemia [E78.5]     Type 1 diabetes mellitus with proliferative retinopathy [E10.3599]      Review of Systems   Constitutional:  Negative for activity change, appetite change and unexpected weight change.   HENT:  Negative for congestion and rhinorrhea.    Eyes:  Negative for visual disturbance.   Respiratory:  Negative for cough and shortness of breath.    Cardiovascular:  Negative for palpitations and leg swelling.   Gastrointestinal:  Negative for constipation, diarrhea and nausea.   Genitourinary:  Negative for hematuria.   Musculoskeletal:  Negative for arthralgias, back pain, gait problem, joint swelling and myalgias.   Skin:  Negative for color change, rash and wound.   Allergic/Immunologic: Negative for environmental allergies, food allergies and immunocompromised state.   Neurological:  Negative for dizziness, weakness and light-headedness.   Psychiatric/Behavioral:  Negative for confusion, decreased concentration, dysphoric mood and sleep disturbance. The patient is not nervous/anxious.      Social History     Socioeconomic History    Marital status:    Tobacco Use    Smoking status: Never    Smokeless tobacco: Never   Vaping Use    Vaping status: Never Used   Substance and Sexual Activity    Alcohol use: No    Drug use: No    Sexual activity: Yes     Partners: Female     Family History   Problem Relation Age of Onset    Arthritis Mother     Cancer Mother          "Lung cancer    Hypertension Mother     Heart disease Father     Diabetes Father     Heart attack Father     Cancer Father         Throat cancer    Cancer Sister         Lung cancer    Hypertension Brother     Diabetes Brother     Hypertension Maternal Grandfather     Cancer Maternal Grandmother         Breast cancer    Diabetes Paternal Grandmother      /64   Pulse 68   Ht 172.7 cm (67.99\")   Wt 68.5 kg (151 lb)   BMI 22.96 kg/m²   Physical Exam  Vitals and nursing note reviewed.   Constitutional:       Appearance: Normal appearance. He is well-developed.   HENT:      Head: Normocephalic and atraumatic.   Eyes:      General: Lids are normal.      Extraocular Movements: Extraocular movements intact.      Conjunctiva/sclera: Conjunctivae normal.      Pupils: Pupils are equal, round, and reactive to light.   Neck:      Thyroid: No thyroid mass or thyromegaly.      Vascular: No carotid bruit.      Trachea: Trachea normal. No tracheal deviation.   Cardiovascular:      Rate and Rhythm: Normal rate and regular rhythm.      Pulses: Normal pulses.      Heart sounds: Normal heart sounds. No murmur heard.     No friction rub. No gallop.   Pulmonary:      Effort: Pulmonary effort is normal. No respiratory distress.      Breath sounds: Normal breath sounds. No wheezing.   Musculoskeletal:         General: No deformity. Normal range of motion.      Cervical back: Normal range of motion and neck supple.      Right lower leg: Edema present.      Left lower leg: Edema present.   Lymphadenopathy:      Cervical: No cervical adenopathy.   Skin:     General: Skin is warm and dry.      Findings: No erythema or rash.      Nails: There is no clubbing.   Neurological:      General: No focal deficit present.      Mental Status: He is alert and oriented to person, place, and time.      Cranial Nerves: No cranial nerve deficit.      Deep Tendon Reflexes: Reflexes are normal and symmetric. Reflexes normal.   Psychiatric:         " Speech: Speech normal.         Behavior: Behavior normal.         Thought Content: Thought content normal.         Judgment: Judgment normal.       Results for orders placed or performed in visit on 07/22/25   POC Glucose, Blood    Collection Time: 07/22/25  8:53 AM    Specimen: Blood   Result Value Ref Range    Glucose 147 (A) 70 - 130 mg/dL    Lot Number [id number]     Expiration Date 08/30/2025    POC Glycosylated Hemoglobin (Hb A1C)    Collection Time: 07/22/25  8:54 AM    Specimen: Blood   Result Value Ref Range    Hemoglobin A1C 7.1 (A) 4.5 - 5.7 %    Lot Number [id number]     Expiration Date 11/06/2026      Diagnoses and all orders for this visit:    1. 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, macular edema presence unspecified, unspecified retinopathy severity (Primary)  Assessment & Plan:  Blood sugar and 90 day average sugar reviewed  Results for orders placed or performed in visit on 07/22/25   POC Glucose, Blood    Collection Time: 07/22/25  8:53 AM    Specimen: Blood   Result Value Ref Range    Glucose 147 (A) 70 - 130 mg/dL    Lot Number [id number]     Expiration Date 08/30/2025    POC Glycosylated Hemoglobin (Hb A1C)    Collection Time: 07/22/25  8:54 AM    Specimen: Blood   Result Value Ref Range    Hemoglobin A1C 7.1 (A) 4.5 - 5.7 %    Lot Number [id number]     Expiration Date 11/06/2026      Adjust correction to 60 to reduce afternoon low sugar  Is utd with eye exam  No foot lesion   Considering G7- sensor sampled  F/u 3-4 months     Orders:  -     POC Glucose, Blood  -     POC Glycosylated Hemoglobin (Hb A1C)    2. Pure hypercholesterolemia  Assessment & Plan:   Is eating low fat diet - taking lipitor 10 mg daily  Ldl 70       3. PVC (premature ventricular contraction)  Assessment & Plan:  Seeing Dr Chung         Return in about 3 months (around 10/22/2025) for Recheck.    Electronically signed by: Aracely Allison MD

## 2025-08-01 ENCOUNTER — RESULTS FOLLOW-UP (OUTPATIENT)
Dept: CARDIOLOGY | Facility: CLINIC | Age: 60
End: 2025-08-01

## 2025-08-01 ENCOUNTER — HOSPITAL ENCOUNTER (OUTPATIENT)
Facility: HOSPITAL | Age: 60
Discharge: HOME | End: 2025-08-01
Payer: COMMERCIAL

## 2025-08-01 VITALS
WEIGHT: 151 LBS | HEIGHT: 64 IN | BODY MASS INDEX: 25.78 KG/M2 | SYSTOLIC BLOOD PRESSURE: 145 MMHG | DIASTOLIC BLOOD PRESSURE: 81 MMHG

## 2025-08-01 DIAGNOSIS — I49.3 PVC (PREMATURE VENTRICULAR CONTRACTION): ICD-10-CM

## 2025-08-01 DIAGNOSIS — R94.39 ABNORMAL STRESS TEST: Primary | ICD-10-CM

## 2025-08-01 LAB
AORTIC DIMENSIONLESS INDEX: 0.53 (DI)
AV MEAN PRESS GRAD SYS DOP V1V2: 2 MMHG
AV VMAX SYS DOP: 92.7 CM/SEC
BH CV ECHO MEAS - AO MAX PG: 3.4 MMHG
BH CV ECHO MEAS - AO ROOT DIAM: 3.3 CM
BH CV ECHO MEAS - AO V2 VTI: 20.4 CM
BH CV ECHO MEAS - AVA(I,D): 1.68 CM2
BH CV ECHO MEAS - EDV(CUBED): 74.1 ML
BH CV ECHO MEAS - EDV(MOD-SP2): 153 ML
BH CV ECHO MEAS - EDV(MOD-SP4): 126 ML
BH CV ECHO MEAS - EF(MOD-SP2): 47.7 %
BH CV ECHO MEAS - EF(MOD-SP4): 47 %
BH CV ECHO MEAS - ESV(CUBED): 22 ML
BH CV ECHO MEAS - ESV(MOD-SP2): 80 ML
BH CV ECHO MEAS - ESV(MOD-SP4): 66.8 ML
BH CV ECHO MEAS - FS: 33.3 %
BH CV ECHO MEAS - IVS/LVPW: 1.1 CM
BH CV ECHO MEAS - IVSD: 1.1 CM
BH CV ECHO MEAS - LA DIMENSION: 3 CM
BH CV ECHO MEAS - LAT PEAK E' VEL: 14 CM/SEC
BH CV ECHO MEAS - LV DIASTOLIC VOL/BSA (35-75): 70.3 CM2
BH CV ECHO MEAS - LV MASS(C)D: 147 GRAMS
BH CV ECHO MEAS - LV MAX PG: 1.12 MMHG
BH CV ECHO MEAS - LV MEAN PG: 1 MMHG
BH CV ECHO MEAS - LV SYSTOLIC VOL/BSA (12-30): 37.3 CM2
BH CV ECHO MEAS - LV V1 MAX: 52.9 CM/SEC
BH CV ECHO MEAS - LV V1 VTI: 10.9 CM
BH CV ECHO MEAS - LVIDD: 4.2 CM
BH CV ECHO MEAS - LVIDS: 2.8 CM
BH CV ECHO MEAS - LVOT AREA: 3.1 CM2
BH CV ECHO MEAS - LVOT DIAM: 2 CM
BH CV ECHO MEAS - LVPWD: 1 CM
BH CV ECHO MEAS - MED PEAK E' VEL: 8.2 CM/SEC
BH CV ECHO MEAS - MV A MAX VEL: 80.7 CM/SEC
BH CV ECHO MEAS - MV DEC SLOPE: 749 CM/SEC2
BH CV ECHO MEAS - MV E MAX VEL: 87 CM/SEC
BH CV ECHO MEAS - MV E/A: 1.08
BH CV ECHO MEAS - MV MAX PG: 4.6 MMHG
BH CV ECHO MEAS - MV MEAN PG: 2 MMHG
BH CV ECHO MEAS - MV P1/2T: 43 MSEC
BH CV ECHO MEAS - MV V2 VTI: 22.6 CM
BH CV ECHO MEAS - MVA(P1/2T): 5.1 CM2
BH CV ECHO MEAS - MVA(VTI): 1.52 CM2
BH CV ECHO MEAS - PA ACC TIME: 0.1 SEC
BH CV ECHO MEAS - SV(LVOT): 34.2 ML
BH CV ECHO MEAS - SV(MOD-SP2): 73 ML
BH CV ECHO MEAS - SV(MOD-SP4): 59.2 ML
BH CV ECHO MEAS - SVI(LVOT): 19.1 ML/M2
BH CV ECHO MEAS - SVI(MOD-SP2): 40.7 ML/M2
BH CV ECHO MEAS - SVI(MOD-SP4): 33 ML/M2
BH CV ECHO MEAS - TAPSE (>1.6): 2.21 CM
BH CV ECHO MEASUREMENTS AVERAGE E/E' RATIO: 7.84
BH CV REST NUCLEAR ISOTOPE DOSE: 9.4 MCI
BH CV STRESS BP STAGE 1: NORMAL
BH CV STRESS BP STAGE 2: NORMAL
BH CV STRESS BP STAGE 3: NORMAL
BH CV STRESS DURATION MIN STAGE 1: 3
BH CV STRESS DURATION MIN STAGE 2: 3
BH CV STRESS DURATION MIN STAGE 3: 3
BH CV STRESS DURATION MIN STAGE 4: 2
BH CV STRESS DURATION SEC STAGE 1: 0
BH CV STRESS DURATION SEC STAGE 2: 0
BH CV STRESS DURATION SEC STAGE 3: 0
BH CV STRESS DURATION SEC STAGE 4: 36
BH CV STRESS GRADE STAGE 1: 10
BH CV STRESS GRADE STAGE 2: 12
BH CV STRESS GRADE STAGE 3: 14
BH CV STRESS GRADE STAGE 4: 21
BH CV STRESS HR STAGE 1: 93
BH CV STRESS HR STAGE 2: 112
BH CV STRESS HR STAGE 3: 126
BH CV STRESS HR STAGE 4: 136
BH CV STRESS METS STAGE 1: 5
BH CV STRESS METS STAGE 2: 7.5
BH CV STRESS METS STAGE 3: 10
BH CV STRESS METS STAGE 4: 13.5
BH CV STRESS NUCLEAR ISOTOPE DOSE: 27.2 MCI
BH CV STRESS O2 STAGE 1: 95
BH CV STRESS O2 STAGE 2: 95
BH CV STRESS O2 STAGE 3: 95
BH CV STRESS O2 STAGE 4: 95
BH CV STRESS PROTOCOL 1: NORMAL
BH CV STRESS RECOVERY BP: NORMAL MMHG
BH CV STRESS RECOVERY HR: 90 BPM
BH CV STRESS RECOVERY O2: 99 %
BH CV STRESS SPEED STAGE 1: 1.7
BH CV STRESS SPEED STAGE 2: 2.5
BH CV STRESS SPEED STAGE 3: 3.4
BH CV STRESS SPEED STAGE 4: 3.4
BH CV STRESS STAGE 1: 1
BH CV STRESS STAGE 2: 2
BH CV STRESS STAGE 3: 3
BH CV STRESS STAGE 4: 4
BH CV XLRA - RV BASE: 3.4 CM
BH CV XLRA - RV LENGTH: 6.7 CM
BH CV XLRA - RV MID: 2.4 CM
BH CV XLRA - TDI S': 16.5 CM/SEC
IVRT: 116 MS
LV EF 2D ECHO EST: 65 %
MAXIMAL PREDICTED HEART RATE: 159 BPM
PERCENT MAX PREDICTED HR: 85.53 %
SPECT HRT GATED+EF W RNC IV: 69 %
STRESS BASELINE BP: NORMAL MMHG
STRESS BASELINE HR: 74 BPM
STRESS O2 SAT REST: 97 %
STRESS PERCENT HR: 101 %
STRESS POST ESTIMATED WORKLOAD: 12.9 METS
STRESS POST EXERCISE DUR MIN: 11 MIN
STRESS POST EXERCISE DUR SEC: 36 SEC
STRESS POST O2 SAT PEAK: 95 %
STRESS POST PEAK BP: NORMAL MMHG
STRESS POST PEAK HR: 136 BPM
STRESS TARGET HR: 135 BPM

## 2025-08-01 PROCEDURE — 34310000005 TECHNETIUM SESTAMIBI: Performed by: INTERNAL MEDICINE

## 2025-08-01 PROCEDURE — 78452 HT MUSCLE IMAGE SPECT MULT: CPT

## 2025-08-01 PROCEDURE — A9500 TC99M SESTAMIBI: HCPCS | Performed by: INTERNAL MEDICINE

## 2025-08-01 PROCEDURE — 93306 TTE W/DOPPLER COMPLETE: CPT

## 2025-08-01 PROCEDURE — 93306 TTE W/DOPPLER COMPLETE: CPT | Performed by: INTERNAL MEDICINE

## 2025-08-01 PROCEDURE — 93017 CV STRESS TEST TRACING ONLY: CPT

## 2025-08-01 RX ADMIN — TECHNETIUM TC 99M SESTAMIBI 1 DOSE: 1 INJECTION INTRAVENOUS at 08:10

## 2025-08-01 RX ADMIN — TECHNETIUM TC 99M SESTAMIBI 1 DOSE: 1 INJECTION INTRAVENOUS at 09:40

## 2025-08-04 PROBLEM — R94.39 ABNORMAL STRESS TEST: Status: ACTIVE | Noted: 2025-08-01

## 2025-08-04 NOTE — TELEPHONE ENCOUNTER
Patient returned call. Spoke with patient about results and recommendations. Patient verbalizes understanding.

## 2025-08-14 ENCOUNTER — PRIOR AUTHORIZATION (OUTPATIENT)
Dept: ENDOCRINOLOGY | Facility: CLINIC | Age: 60
End: 2025-08-14
Payer: COMMERCIAL

## 2025-08-15 ENCOUNTER — TRANSCRIBE ORDERS (OUTPATIENT)
Dept: CARDIAC REHAB | Facility: HOSPITAL | Age: 60
End: 2025-08-15
Payer: COMMERCIAL

## 2025-08-15 ENCOUNTER — HOSPITAL ENCOUNTER (OUTPATIENT)
Facility: HOSPITAL | Age: 60
Setting detail: HOSPITAL OUTPATIENT SURGERY
Discharge: HOME | End: 2025-08-15
Attending: INTERNAL MEDICINE | Admitting: INTERNAL MEDICINE
Payer: COMMERCIAL

## 2025-08-15 ENCOUNTER — APPOINTMENT (OUTPATIENT)
Dept: GENERAL RADIOLOGY | Facility: HOSPITAL | Age: 60
End: 2025-08-15
Payer: COMMERCIAL

## 2025-08-15 VITALS
WEIGHT: 149.8 LBS | RESPIRATION RATE: 16 BRPM | DIASTOLIC BLOOD PRESSURE: 80 MMHG | SYSTOLIC BLOOD PRESSURE: 156 MMHG | BODY MASS INDEX: 22.7 KG/M2 | OXYGEN SATURATION: 100 % | HEIGHT: 68 IN | HEART RATE: 59 BPM

## 2025-08-15 DIAGNOSIS — R94.39 ABNORMAL STRESS TEST: ICD-10-CM

## 2025-08-15 DIAGNOSIS — I49.3 PVC (PREMATURE VENTRICULAR CONTRACTION): Primary | ICD-10-CM

## 2025-08-15 DIAGNOSIS — Z95.5 STENTED CORONARY ARTERY: Primary | ICD-10-CM

## 2025-08-15 LAB
ALBUMIN SERPL-MCNC: 4.1 G/DL (ref 3.5–5.2)
ALBUMIN/GLOB SERPL: 1.6 G/DL
ALP SERPL-CCNC: 91 U/L (ref 39–117)
ALT SERPL W P-5'-P-CCNC: 14 U/L (ref 1–41)
ANION GAP SERPL CALCULATED.3IONS-SCNC: 7 MMOL/L (ref 5–15)
AST SERPL-CCNC: 22 U/L (ref 1–40)
BILIRUB SERPL-MCNC: 0.5 MG/DL (ref 0–1.2)
BUN SERPL-MCNC: 16.9 MG/DL (ref 8–23)
BUN/CREAT SERPL: 19.4 (ref 7–25)
CALCIUM SPEC-SCNC: 9.4 MG/DL (ref 8.6–10.5)
CHLORIDE SERPL-SCNC: 105 MMOL/L (ref 98–107)
CHOLEST SERPL-MCNC: 137 MG/DL (ref 0–200)
CO2 SERPL-SCNC: 30 MMOL/L (ref 22–29)
CREAT BLDA-MCNC: 1 MG/DL (ref 0.6–1.3)
CREAT SERPL-MCNC: 0.87 MG/DL (ref 0.76–1.27)
DEPRECATED RDW RBC AUTO: 44.8 FL (ref 37–54)
EGFRCR SERPLBLD CKD-EPI 2021: 98.2 ML/MIN/1.73
ERYTHROCYTE [DISTWIDTH] IN BLOOD BY AUTOMATED COUNT: 13.8 % (ref 12.3–15.4)
GLOBULIN UR ELPH-MCNC: 2.5 GM/DL
GLUCOSE SERPL-MCNC: 109 MG/DL (ref 65–99)
HBA1C MFR BLD: 7.36 % (ref 4.8–5.6)
HCT VFR BLD AUTO: 43.9 % (ref 37.5–51)
HDLC SERPL-MCNC: 56 MG/DL (ref 40–60)
HGB BLD-MCNC: 14.4 G/DL (ref 13–17.7)
LDLC SERPL CALC-MCNC: 71 MG/DL (ref 0–100)
LDLC/HDLC SERPL: 1.3 {RATIO}
LV EF ANGIOGRAM EST: 55 %
MCH RBC QN AUTO: 29.1 PG (ref 26.6–33)
MCHC RBC AUTO-ENTMCNC: 32.8 G/DL (ref 31.5–35.7)
MCV RBC AUTO: 88.7 FL (ref 79–97)
PLATELET # BLD AUTO: 199 10*3/MM3 (ref 140–450)
PMV BLD AUTO: 9.4 FL (ref 6–12)
POTASSIUM SERPL-SCNC: 4.3 MMOL/L (ref 3.5–5.2)
PROT SERPL-MCNC: 6.6 G/DL (ref 6–8.5)
RBC # BLD AUTO: 4.95 10*6/MM3 (ref 4.14–5.8)
SODIUM SERPL-SCNC: 142 MMOL/L (ref 136–145)
TRIGL SERPL-MCNC: 40 MG/DL (ref 0–150)
VLDLC SERPL-MCNC: 10 MG/DL (ref 5–40)
WBC NRBC COR # BLD AUTO: 5.8 10*3/MM3 (ref 3.4–10.8)

## 2025-08-15 PROCEDURE — C1725 CATH, TRANSLUMIN NON-LASER: HCPCS | Performed by: INTERNAL MEDICINE

## 2025-08-15 PROCEDURE — C9600 PERC DRUG-EL COR STENT SING: HCPCS | Performed by: INTERNAL MEDICINE

## 2025-08-15 PROCEDURE — C1769 GUIDE WIRE: HCPCS | Performed by: INTERNAL MEDICINE

## 2025-08-15 PROCEDURE — 25510000001 IOPAMIDOL PER 1 ML: Performed by: INTERNAL MEDICINE

## 2025-08-15 PROCEDURE — C1894 INTRO/SHEATH, NON-LASER: HCPCS | Performed by: INTERNAL MEDICINE

## 2025-08-15 PROCEDURE — 25810000003 SODIUM CHLORIDE 0.9 % SOLUTION: Performed by: INTERNAL MEDICINE

## 2025-08-15 PROCEDURE — 93571 IV DOP VEL&/PRESS C FLO 1ST: CPT | Performed by: INTERNAL MEDICINE

## 2025-08-15 PROCEDURE — C1753 CATH, INTRAVAS ULTRASOUND: HCPCS | Performed by: INTERNAL MEDICINE

## 2025-08-15 PROCEDURE — 25010000002 BIVALIRUDIN TRIFLUOROACETATE 250 MG RECONSTITUTED SOLUTION 1 EACH VIAL: Performed by: INTERNAL MEDICINE

## 2025-08-15 PROCEDURE — 71045 X-RAY EXAM CHEST 1 VIEW: CPT

## 2025-08-15 PROCEDURE — C1887 CATHETER, GUIDING: HCPCS | Performed by: INTERNAL MEDICINE

## 2025-08-15 PROCEDURE — 93458 L HRT ARTERY/VENTRICLE ANGIO: CPT | Performed by: INTERNAL MEDICINE

## 2025-08-15 PROCEDURE — 25010000002 NICARDIPINE 2.5 MG/ML SOLUTION: Performed by: INTERNAL MEDICINE

## 2025-08-15 PROCEDURE — 93799 UNLISTED CV SVC/PROCEDURE: CPT | Performed by: INTERNAL MEDICINE

## 2025-08-15 PROCEDURE — 25010000002 FENTANYL CITRATE (PF) 50 MCG/ML SOLUTION: Performed by: INTERNAL MEDICINE

## 2025-08-15 PROCEDURE — 80061 LIPID PANEL: CPT | Performed by: NURSE PRACTITIONER

## 2025-08-15 PROCEDURE — 80053 COMPREHEN METABOLIC PANEL: CPT | Performed by: NURSE PRACTITIONER

## 2025-08-15 PROCEDURE — 82565 ASSAY OF CREATININE: CPT

## 2025-08-15 PROCEDURE — C1874 STENT, COATED/COV W/DEL SYS: HCPCS | Performed by: INTERNAL MEDICINE

## 2025-08-15 PROCEDURE — 36415 COLL VENOUS BLD VENIPUNCTURE: CPT | Performed by: INTERNAL MEDICINE

## 2025-08-15 PROCEDURE — 25010000002 MIDAZOLAM PER 1 MG: Performed by: INTERNAL MEDICINE

## 2025-08-15 PROCEDURE — 83036 HEMOGLOBIN GLYCOSYLATED A1C: CPT | Performed by: NURSE PRACTITIONER

## 2025-08-15 PROCEDURE — 85027 COMPLETE CBC AUTOMATED: CPT | Performed by: NURSE PRACTITIONER

## 2025-08-15 PROCEDURE — 25010000002 HEPARIN (PORCINE) PER 1000 UNITS: Performed by: INTERNAL MEDICINE

## 2025-08-15 PROCEDURE — 25010000002 LIDOCAINE PF 1% 1 % SOLUTION: Performed by: INTERNAL MEDICINE

## 2025-08-15 PROCEDURE — 92928 PRQ TCAT PLMT NTRAC ST 1 LES: CPT | Performed by: INTERNAL MEDICINE

## 2025-08-15 PROCEDURE — 92978 ENDOLUMINL IVUS OCT C 1ST: CPT | Performed by: INTERNAL MEDICINE

## 2025-08-15 DEVICE — XIENCE SKYPOINT™ EVEROLIMUS ELUTING CORONARY STENT SYSTEM 2.75 MM X 12 MM / RAPID-EXCHANGE
Type: IMPLANTABLE DEVICE | Site: CORONARY | Status: FUNCTIONAL
Brand: XIENCE SKYPOINT™

## 2025-08-15 DEVICE — XIENCE SKYPOINT™ EVEROLIMUS ELUTING CORONARY STENT SYSTEM 3.00 MM X 33 MM / RAPID-EXCHANGE
Type: IMPLANTABLE DEVICE | Site: CORONARY | Status: FUNCTIONAL
Brand: XIENCE SKYPOINT™

## 2025-08-15 RX ORDER — ONDANSETRON 2 MG/ML
4 INJECTION INTRAMUSCULAR; INTRAVENOUS EVERY 6 HOURS PRN
Status: DISCONTINUED | OUTPATIENT
Start: 2025-08-15 | End: 2025-08-15 | Stop reason: HOSPADM

## 2025-08-15 RX ORDER — ATORVASTATIN CALCIUM 80 MG/1
80 TABLET, FILM COATED ORAL NIGHTLY
Qty: 90 TABLET | Refills: 3 | Status: SHIPPED | OUTPATIENT
Start: 2025-08-15

## 2025-08-15 RX ORDER — BISOPROLOL FUMARATE 5 MG/1
2.5 TABLET, FILM COATED ORAL DAILY
Qty: 30 TABLET | Refills: 6 | Status: SHIPPED | OUTPATIENT
Start: 2025-08-15 | End: 2025-08-21

## 2025-08-15 RX ORDER — CLOPIDOGREL BISULFATE 75 MG/1
75 TABLET ORAL DAILY
Qty: 30 TABLET | Refills: 11 | Status: SHIPPED | OUTPATIENT
Start: 2025-08-15

## 2025-08-15 RX ORDER — FENTANYL CITRATE 50 UG/ML
INJECTION, SOLUTION INTRAMUSCULAR; INTRAVENOUS
Status: DISCONTINUED | OUTPATIENT
Start: 2025-08-15 | End: 2025-08-15 | Stop reason: HOSPADM

## 2025-08-15 RX ORDER — SODIUM CHLORIDE 0.9 % (FLUSH) 0.9 %
10 SYRINGE (ML) INJECTION EVERY 12 HOURS SCHEDULED
Status: DISCONTINUED | OUTPATIENT
Start: 2025-08-15 | End: 2025-08-15 | Stop reason: HOSPADM

## 2025-08-15 RX ORDER — LIDOCAINE HYDROCHLORIDE 10 MG/ML
INJECTION, SOLUTION EPIDURAL; INFILTRATION; INTRACAUDAL; PERINEURAL
Status: DISCONTINUED | OUTPATIENT
Start: 2025-08-15 | End: 2025-08-15 | Stop reason: HOSPADM

## 2025-08-15 RX ORDER — ASPIRIN 81 MG/1
81 TABLET, CHEWABLE ORAL DAILY
Start: 2025-08-15

## 2025-08-15 RX ORDER — ACETAMINOPHEN 325 MG/1
650 TABLET ORAL EVERY 4 HOURS PRN
Status: DISCONTINUED | OUTPATIENT
Start: 2025-08-15 | End: 2025-08-15 | Stop reason: HOSPADM

## 2025-08-15 RX ORDER — ASPIRIN 325 MG
325 TABLET ORAL ONCE
Status: COMPLETED | OUTPATIENT
Start: 2025-08-15 | End: 2025-08-15

## 2025-08-15 RX ORDER — SODIUM CHLORIDE 9 MG/ML
40 INJECTION, SOLUTION INTRAVENOUS AS NEEDED
Status: DISCONTINUED | OUTPATIENT
Start: 2025-08-15 | End: 2025-08-15 | Stop reason: HOSPADM

## 2025-08-15 RX ORDER — NITROGLYCERIN 0.4 MG/1
0.4 TABLET SUBLINGUAL
Status: DISCONTINUED | OUTPATIENT
Start: 2025-08-15 | End: 2025-08-15 | Stop reason: HOSPADM

## 2025-08-15 RX ORDER — SENNOSIDES 8.6 MG
325 CAPSULE ORAL DAILY
Status: DISCONTINUED | OUTPATIENT
Start: 2025-08-16 | End: 2025-08-15 | Stop reason: HOSPADM

## 2025-08-15 RX ORDER — HEPARIN SODIUM 1000 [USP'U]/ML
INJECTION, SOLUTION INTRAVENOUS; SUBCUTANEOUS
Status: DISCONTINUED | OUTPATIENT
Start: 2025-08-15 | End: 2025-08-15 | Stop reason: HOSPADM

## 2025-08-15 RX ORDER — SODIUM CHLORIDE 0.9 % (FLUSH) 0.9 %
1-10 SYRINGE (ML) INJECTION AS NEEDED
Status: DISCONTINUED | OUTPATIENT
Start: 2025-08-15 | End: 2025-08-15 | Stop reason: HOSPADM

## 2025-08-15 RX ORDER — CLOPIDOGREL BISULFATE 75 MG/1
TABLET ORAL
Status: DISCONTINUED | OUTPATIENT
Start: 2025-08-15 | End: 2025-08-15 | Stop reason: HOSPADM

## 2025-08-15 RX ORDER — IOPAMIDOL 755 MG/ML
INJECTION, SOLUTION INTRAVASCULAR
Status: DISCONTINUED | OUTPATIENT
Start: 2025-08-15 | End: 2025-08-15 | Stop reason: HOSPADM

## 2025-08-15 RX ORDER — MIDAZOLAM HYDROCHLORIDE 1 MG/ML
INJECTION, SOLUTION INTRAMUSCULAR; INTRAVENOUS
Status: DISCONTINUED | OUTPATIENT
Start: 2025-08-15 | End: 2025-08-15 | Stop reason: HOSPADM

## 2025-08-15 RX ADMIN — ASPIRIN 325 MG: 325 TABLET ORAL at 07:12

## 2025-08-18 ENCOUNTER — CALL CENTER PROGRAMS (OUTPATIENT)
Dept: CALL CENTER | Facility: HOSPITAL | Age: 60
End: 2025-08-18
Payer: COMMERCIAL

## 2025-08-19 PROBLEM — I25.10 CORONARY ARTERY DISEASE INVOLVING NATIVE CORONARY ARTERY OF NATIVE HEART WITHOUT ANGINA PECTORIS: Status: ACTIVE | Noted: 2025-08-19

## 2025-08-21 RX ORDER — CARVEDILOL 3.12 MG/1
3.12 TABLET ORAL 2 TIMES DAILY
Qty: 60 TABLET | Refills: 11 | Status: SHIPPED | OUTPATIENT
Start: 2025-08-21

## (undated) DEVICE — CATH DIAG EXPO M/ PK 6FR FL4/FR4 PIG 3PK

## (undated) DEVICE — ST INF PRI SMRTSTE 20DRP 2VLV 24ML 117

## (undated) DEVICE — RUNTHROUGH NS EXTRA FLOPPY PTCA GUIDEWIRE: Brand: RUNTHROUGH

## (undated) DEVICE — NDL ANGIOGR ADV THN SMOTH SGLWALL 21G 1.5

## (undated) DEVICE — NC TREK NEO™ CORONARY DILATATION CATHETER 3.50 MM X 20 MM / RAPID-EXCHANGE: Brand: NC TREK NEO™

## (undated) DEVICE — TREK™ CORONARY DILATATION CATHETER 3.0 MM X 20 MM / RAPID-EXCHANGE: Brand: TREK™

## (undated) DEVICE — GLIDESHEATH BASIC HYDROPHILIC COATED INTRODUCER SHEATH: Brand: GLIDESHEATH

## (undated) DEVICE — PK CATH CARD 10

## (undated) DEVICE — TR BAND RADIAL ARTERY COMPRESSION DEVICE: Brand: TR BAND

## (undated) DEVICE — GUIDE CATHETER: Brand: MACH1™

## (undated) DEVICE — ST EXT IV SMRTSTE 2VLV FIX M LL 6ML 41

## (undated) DEVICE — GW PERIPH GUIDERIGHT STD/EXCHNG/J/TIP SS 0.035IN 5X260CM

## (undated) DEVICE — VBT GC 6F .070 XB 3 100CM: Brand: VISTA BRITE TIP

## (undated) DEVICE — NC TREK NEO™ CORONARY DILATATION CATHETER 3.00 MM X 20 MM / RAPID-EXCHANGE: Brand: NC TREK NEO™

## (undated) DEVICE — MODEL AT P65, P/N 701554-001KIT CONTENTS: HAND CONTROLLER, 3-WAY HIGH-PRESSURE STOPCOCK WITH ROTATING END AND PREMIUM HIGH-PRESSURE TUBING: Brand: ANGIOTOUCH® KIT

## (undated) DEVICE — CATH DIAG IMPULSE FL3.5 6F 100CM

## (undated) DEVICE — TREK™ CORONARY DILATATION CATHETER 2.50 MM X 12 MM / RAPID-EXCHANGE: Brand: TREK™

## (undated) DEVICE — Device: Brand: OMNIWIRE PRESSURE GUIDE WIRE

## (undated) DEVICE — KT CATH IMG DRAGONFLY/OPSTAR 2.7F 135CM

## (undated) DEVICE — MODEL BT2000 P/N 700287-012KIT CONTENTS: MANIFOLD WITH SALINE AND CONTRAST PORTS, SALINE TUBING WITH SPIKE AND HAND SYRINGE, TRANSDUCER: Brand: BT2000 AUTOMATED MANIFOLD KIT